# Patient Record
Sex: FEMALE | Race: BLACK OR AFRICAN AMERICAN | NOT HISPANIC OR LATINO | Employment: OTHER | URBAN - METROPOLITAN AREA
[De-identification: names, ages, dates, MRNs, and addresses within clinical notes are randomized per-mention and may not be internally consistent; named-entity substitution may affect disease eponyms.]

---

## 2020-09-03 ENCOUNTER — OFFICE VISIT (OUTPATIENT)
Dept: GASTROENTEROLOGY | Facility: CLINIC | Age: 75
End: 2020-09-03
Payer: MEDICARE

## 2020-09-03 ENCOUNTER — LAB VISIT (OUTPATIENT)
Dept: LAB | Facility: HOSPITAL | Age: 75
End: 2020-09-03
Attending: NURSE PRACTITIONER
Payer: MEDICARE

## 2020-09-03 VITALS
SYSTOLIC BLOOD PRESSURE: 130 MMHG | HEIGHT: 65 IN | HEART RATE: 76 BPM | DIASTOLIC BLOOD PRESSURE: 76 MMHG | WEIGHT: 191.81 LBS | BODY MASS INDEX: 31.96 KG/M2 | OXYGEN SATURATION: 99 %

## 2020-09-03 DIAGNOSIS — K62.5 RECTAL BLEEDING: ICD-10-CM

## 2020-09-03 DIAGNOSIS — K62.5 RECTAL BLEEDING: Primary | ICD-10-CM

## 2020-09-03 DIAGNOSIS — K59.04 CHRONIC IDIOPATHIC CONSTIPATION: ICD-10-CM

## 2020-09-03 LAB
BASOPHILS # BLD AUTO: 0.04 K/UL (ref 0–0.2)
BASOPHILS NFR BLD: 0.5 % (ref 0–1.9)
DIFFERENTIAL METHOD: ABNORMAL
EOSINOPHIL # BLD AUTO: 0.1 K/UL (ref 0–0.5)
EOSINOPHIL NFR BLD: 1.4 % (ref 0–8)
ERYTHROCYTE [DISTWIDTH] IN BLOOD BY AUTOMATED COUNT: 14.6 % (ref 11.5–14.5)
HCT VFR BLD AUTO: 38.1 % (ref 37–48.5)
HGB BLD-MCNC: 11.9 G/DL (ref 12–16)
IMM GRANULOCYTES # BLD AUTO: 0.01 K/UL (ref 0–0.04)
IMM GRANULOCYTES NFR BLD AUTO: 0.1 % (ref 0–0.5)
LYMPHOCYTES # BLD AUTO: 2.3 K/UL (ref 1–4.8)
LYMPHOCYTES NFR BLD: 28.3 % (ref 18–48)
MCH RBC QN AUTO: 27.7 PG (ref 27–31)
MCHC RBC AUTO-ENTMCNC: 31.2 G/DL (ref 32–36)
MCV RBC AUTO: 89 FL (ref 82–98)
MONOCYTES # BLD AUTO: 0.7 K/UL (ref 0.3–1)
MONOCYTES NFR BLD: 8.6 % (ref 4–15)
NEUTROPHILS # BLD AUTO: 5 K/UL (ref 1.8–7.7)
NEUTROPHILS NFR BLD: 61.1 % (ref 38–73)
NRBC BLD-RTO: 0 /100 WBC
PLATELET # BLD AUTO: 271 K/UL (ref 150–350)
PMV BLD AUTO: 11 FL (ref 9.2–12.9)
RBC # BLD AUTO: 4.3 M/UL (ref 4–5.4)
WBC # BLD AUTO: 8.13 K/UL (ref 3.9–12.7)

## 2020-09-03 PROCEDURE — 99999 PR PBB SHADOW E&M-NEW PATIENT-LVL IV: CPT | Mod: PBBFAC,,, | Performed by: NURSE PRACTITIONER

## 2020-09-03 PROCEDURE — 85025 COMPLETE CBC W/AUTO DIFF WBC: CPT

## 2020-09-03 PROCEDURE — 99204 PR OFFICE/OUTPT VISIT, NEW, LEVL IV, 45-59 MIN: ICD-10-PCS | Mod: S$PBB,,, | Performed by: NURSE PRACTITIONER

## 2020-09-03 PROCEDURE — 36415 COLL VENOUS BLD VENIPUNCTURE: CPT

## 2020-09-03 PROCEDURE — 99204 OFFICE O/P NEW MOD 45 MIN: CPT | Mod: S$PBB,,, | Performed by: NURSE PRACTITIONER

## 2020-09-03 PROCEDURE — 99999 PR PBB SHADOW E&M-NEW PATIENT-LVL IV: ICD-10-PCS | Mod: PBBFAC,,, | Performed by: NURSE PRACTITIONER

## 2020-09-03 PROCEDURE — 99204 OFFICE O/P NEW MOD 45 MIN: CPT | Mod: PBBFAC | Performed by: NURSE PRACTITIONER

## 2020-09-03 RX ORDER — CYCLOBENZAPRINE HCL 5 MG
5 TABLET ORAL 3 TIMES DAILY PRN
COMMUNITY

## 2020-09-03 RX ORDER — CARVEDILOL 6.25 MG/1
TABLET ORAL
COMMUNITY
Start: 2020-02-06

## 2020-09-03 RX ORDER — PANTOPRAZOLE SODIUM 40 MG/1
40 TABLET, DELAYED RELEASE ORAL
COMMUNITY
Start: 2020-02-06

## 2020-09-03 RX ORDER — BENZONATATE 100 MG/1
100 CAPSULE ORAL 3 TIMES DAILY PRN
COMMUNITY
Start: 2020-06-24 | End: 2021-06-24

## 2020-09-03 RX ORDER — LOSARTAN POTASSIUM AND HYDROCHLOROTHIAZIDE 25; 100 MG/1; MG/1
1 TABLET ORAL
COMMUNITY
Start: 2020-08-07 | End: 2022-05-11

## 2020-09-03 RX ORDER — ASPIRIN 81 MG/1
81 TABLET ORAL DAILY
COMMUNITY

## 2020-09-03 RX ORDER — SUCRALFATE 1 G/1
1 TABLET ORAL 4 TIMES DAILY
COMMUNITY

## 2020-09-03 RX ORDER — POTASSIUM CHLORIDE 20 MEQ/1
TABLET, EXTENDED RELEASE ORAL
COMMUNITY
Start: 2020-02-06

## 2020-09-03 RX ORDER — CLOPIDOGREL BISULFATE 75 MG/1
75 TABLET ORAL
COMMUNITY
Start: 2020-02-06

## 2020-09-03 RX ORDER — FLUOCINONIDE 0.5 MG/G
CREAM TOPICAL
COMMUNITY
Start: 2020-02-06

## 2020-09-03 RX ORDER — ROSUVASTATIN CALCIUM 20 MG/1
20 TABLET, COATED ORAL
COMMUNITY
Start: 2020-02-06 | End: 2021-08-10

## 2020-09-03 RX ORDER — SODIUM, POTASSIUM,MAG SULFATES 17.5-3.13G
SOLUTION, RECONSTITUTED, ORAL ORAL
Qty: 354 ML | Refills: 0 | Status: SHIPPED | OUTPATIENT
Start: 2020-09-03 | End: 2021-08-10

## 2020-09-03 RX ORDER — GABAPENTIN 300 MG/1
300 CAPSULE ORAL
COMMUNITY
Start: 2020-06-18 | End: 2021-06-28

## 2020-09-03 NOTE — PROGRESS NOTES
Clinic Consult:  [facility] Consultation Note    Reason for Consult:  The primary encounter diagnosis was Rectal bleeding. A diagnosis of Chronic idiopathic constipation was also pertinent to this visit.    PCP: Primary Doctor No   No address on file    HPI:  This is a 75 y.o. female here for evaluation of the above. She has a history of constipation, hemorrhoids, and rectal bleeding. Bleeding occurs intermittently. She is on Linzess 145 mcg three times per week for constipation. She was doing well until this 3 days ago when she had significant rectal bleeding with red blood. It was enough to splash on the toilet seat and walls. This occurred multiple times. Most recently this morning. She denies any symptoms of anemia. Her last colonoscopy was done 2/5/2016. +ve for internal hemorrhoids, diverticulosis, 3 polyps.     Review of Systems   Constitutional: Negative for fever, malaise/fatigue and weight loss.   HENT: Negative for sore throat.    Respiratory: Negative for cough and wheezing.    Cardiovascular: Negative for chest pain and palpitations.   Gastrointestinal: Positive for blood in stool. Negative for abdominal pain, constipation, diarrhea, heartburn, melena, nausea and vomiting.   Genitourinary: Negative for dysuria and frequency.   Musculoskeletal: Negative for back pain, joint pain, myalgias and neck pain.   Skin: Negative for itching and rash.   Neurological: Negative for dizziness, speech change, seizures, loss of consciousness and headaches.   Psychiatric/Behavioral: Negative for depression and substance abuse. The patient is not nervous/anxious.        Medical History:  has a past medical history of ASHD (arteriosclerotic heart disease), Bladder cancer, Chronic back pain, Colon polyp, Diabetes, GERD (gastroesophageal reflux disease), Heart attack, HTN (hypertension), Hyperlipidemia, and Kidney stones.    Surgical History:  has a past surgical history that includes Coronary angioplasty with  "stent; Hysterectomy; lower back surgery ; Rotator cuff repair; TURBT, WITH BLUE LIGHT CYSTOSCOPY AND CYSVIEW; and Wrist surgery.    Family History: family history includes Arthritis in her father; Cancer in her brother; Diabetes in her brother, sister, and son; Hyperlipidemia in her mother and sister; Hypertension in her father and mother; Stroke in her sister..     Social History:  reports that she has never smoked. She does not have any smokeless tobacco history on file. She reports that she does not drink alcohol or use drugs.    Allergies: Reviewed    Home Medications:   Current Outpatient Medications on File Prior to Visit   Medication Sig Dispense Refill    aspirin (ECOTRIN) 81 MG EC tablet Take 81 mg by mouth once daily.      benzonatate (TESSALON) 100 MG capsule Take 100 mg by mouth 3 (three) times daily as needed.      carvediloL (COREG) 6.25 MG tablet Take 1 tablet by mouth 2 (two) times daily with meals      clopidogreL (PLAVIX) 75 mg tablet Take 75 mg by mouth.      cyclobenzaprine (FLEXERIL) 5 MG tablet Take 5 mg by mouth 3 (three) times daily as needed.      fluocinonide 0.05% (LIDEX) 0.05 % cream Apply topically.      gabapentin (NEURONTIN) 300 MG capsule Take 300 mg by mouth.      linaCLOtide (LINZESS) 145 mcg Cap capsule TAKE 1 CAPSULE BY MOUTH ONCE DAILY IN THE MORNING BEFORE BREAKFAST      losartan-hydrochlorothiazide 100-25 mg (HYZAAR) 100-25 mg per tablet Take 1 tablet by mouth.      multivitamin capsule Take 1 capsule by mouth.      pantoprazole (PROTONIX) 40 MG tablet Take 40 mg by mouth.      potassium chloride SA (K-DUR,KLOR-CON) 20 MEQ tablet Take 1 tablet by mouth 2 (two) daily      rosuvastatin (CRESTOR) 20 MG tablet Take 20 mg by mouth.      sucralfate (CARAFATE) 1 gram tablet Take 1 g by mouth 4 (four) times daily.       No current facility-administered medications on file prior to visit.        Physical Exam:  /76   Pulse 76   Ht 5' 5" (1.651 m)   Wt 87 kg (191 " lb 12.8 oz)   SpO2 99%   BMI 31.92 kg/m²   Body mass index is 31.92 kg/m².  Physical Exam   Constitutional: She is oriented to person, place, and time and well-developed, well-nourished, and in no distress. No distress.   HENT:   Head: Normocephalic.   Eyes: Pupils are equal, round, and reactive to light. Conjunctivae are normal.   Cardiovascular: Normal rate, regular rhythm and normal heart sounds.   Pulmonary/Chest: Effort normal and breath sounds normal. No respiratory distress.   Abdominal: Soft. Bowel sounds are normal. She exhibits no distension. There is no abdominal tenderness.   Neurological: She is alert and oriented to person, place, and time. No cranial nerve deficit.   Skin: Skin is warm and dry. No rash noted.   Psychiatric: Mood and affect normal.       Labs: Pertinent labs reviewed.    Assessment:  1. Rectal bleeding    2. Chronic idiopathic constipation      Bleeding likely diverticular bleed.     Recommendations:   - needs colonoscopy for further evaluation  - CBC today.     Rectal bleeding  -     CBC auto differential; Future; Expected date: 09/03/2020  -     Case request GI: COLONOSCOPY  -     SUPREP BOWEL PREP KIT 17.5-3.13-1.6 gram SolR; Use as directed  Dispense: 354 mL; Refill: 0  -     COVID-19 Routine Screening; Future; Expected date: 09/03/2020    Chronic idiopathic constipation      Follow up to be determined after results/ procedure(s).    Thank you so much for allowing me to participate in the care of Porsha Adams  DAVE Alicia

## 2020-09-17 ENCOUNTER — TELEPHONE (OUTPATIENT)
Dept: ENDOSCOPY | Facility: HOSPITAL | Age: 75
End: 2020-09-17

## 2020-12-01 ENCOUNTER — TELEPHONE (OUTPATIENT)
Dept: UROLOGY | Facility: CLINIC | Age: 75
End: 2020-12-01

## 2020-12-01 ENCOUNTER — OFFICE VISIT (OUTPATIENT)
Dept: UROLOGY | Facility: CLINIC | Age: 75
End: 2020-12-01
Payer: MEDICARE

## 2020-12-01 VITALS
RESPIRATION RATE: 18 BRPM | SYSTOLIC BLOOD PRESSURE: 160 MMHG | HEIGHT: 65 IN | WEIGHT: 198.88 LBS | BODY MASS INDEX: 33.13 KG/M2 | HEART RATE: 80 BPM | DIASTOLIC BLOOD PRESSURE: 84 MMHG

## 2020-12-01 DIAGNOSIS — C67.9 MALIGNANT NEOPLASM OF URINARY BLADDER, UNSPECIFIED SITE: ICD-10-CM

## 2020-12-01 DIAGNOSIS — R10.9 FLANK PAIN: Primary | ICD-10-CM

## 2020-12-01 DIAGNOSIS — N20.0 KIDNEY STONES: ICD-10-CM

## 2020-12-01 PROBLEM — I10 HYPERTENSION: Status: ACTIVE | Noted: 2017-09-28

## 2020-12-01 PROBLEM — E87.6 HYPOKALEMIA: Status: ACTIVE | Noted: 2020-02-11

## 2020-12-01 PROBLEM — E11.69 HYPERLIPIDEMIA ASSOCIATED WITH TYPE 2 DIABETES MELLITUS: Status: ACTIVE | Noted: 2017-09-28

## 2020-12-01 PROBLEM — I82.503: Status: ACTIVE | Noted: 2020-08-28

## 2020-12-01 PROBLEM — I25.2 HISTORY OF MI (MYOCARDIAL INFARCTION): Status: ACTIVE | Noted: 2020-02-11

## 2020-12-01 PROBLEM — Z86.010 HISTORY OF COLONIC POLYPS: Status: ACTIVE | Noted: 2019-09-19

## 2020-12-01 PROBLEM — E78.5 HYPERLIPIDEMIA ASSOCIATED WITH TYPE 2 DIABETES MELLITUS: Status: ACTIVE | Noted: 2017-09-28

## 2020-12-01 PROBLEM — G89.29 CHRONIC BACK PAIN: Status: ACTIVE | Noted: 2020-12-01

## 2020-12-01 PROBLEM — E11.65 TYPE 2 DIABETES MELLITUS WITH HYPERGLYCEMIA, WITHOUT LONG-TERM CURRENT USE OF INSULIN: Status: ACTIVE | Noted: 2020-12-01

## 2020-12-01 PROBLEM — K62.5 RECTAL BLEEDING: Status: ACTIVE | Noted: 2020-09-04

## 2020-12-01 PROBLEM — I83.813 VARICOSE VEINS OF BOTH LOWER EXTREMITIES WITH PAIN: Status: ACTIVE | Noted: 2020-08-28

## 2020-12-01 PROBLEM — Z86.0100 HISTORY OF COLONIC POLYPS: Status: ACTIVE | Noted: 2019-09-19

## 2020-12-01 PROBLEM — K21.9 GASTROESOPHAGEAL REFLUX DISEASE WITHOUT ESOPHAGITIS: Status: ACTIVE | Noted: 2020-12-01

## 2020-12-01 PROBLEM — E66.01 MORBID OBESITY DUE TO EXCESS CALORIES: Status: ACTIVE | Noted: 2017-09-28

## 2020-12-01 PROBLEM — M54.9 CHRONIC BACK PAIN: Status: ACTIVE | Noted: 2020-12-01

## 2020-12-01 LAB
BILIRUB SERPL-MCNC: NORMAL MG/DL
BLOOD URINE, POC: NORMAL
CLARITY, POC UA: NORMAL
COLOR, POC UA: YELLOW
GLUCOSE UR QL STRIP: NORMAL
KETONES UR QL STRIP: NORMAL
LEUKOCYTE ESTERASE URINE, POC: NORMAL
NITRITE, POC UA: NORMAL
PH, POC UA: 8
PROTEIN, POC: NORMAL
SPECIFIC GRAVITY, POC UA: 1
UROBILINOGEN, POC UA: NORMAL

## 2020-12-01 PROCEDURE — 99214 PR OFFICE/OUTPT VISIT, EST, LEVL IV, 30-39 MIN: ICD-10-PCS | Mod: S$PBB,,, | Performed by: UROLOGY

## 2020-12-01 PROCEDURE — 99999 PR PBB SHADOW E&M-EST. PATIENT-LVL IV: CPT | Mod: PBBFAC,,, | Performed by: UROLOGY

## 2020-12-01 PROCEDURE — 81002 URINALYSIS NONAUTO W/O SCOPE: CPT | Mod: PBBFAC,PO | Performed by: UROLOGY

## 2020-12-01 PROCEDURE — 87086 URINE CULTURE/COLONY COUNT: CPT

## 2020-12-01 PROCEDURE — 99214 OFFICE O/P EST MOD 30 MIN: CPT | Mod: PBBFAC,PO | Performed by: UROLOGY

## 2020-12-01 PROCEDURE — 99999 PR PBB SHADOW E&M-EST. PATIENT-LVL IV: ICD-10-PCS | Mod: PBBFAC,,, | Performed by: UROLOGY

## 2020-12-01 PROCEDURE — 99214 OFFICE O/P EST MOD 30 MIN: CPT | Mod: S$PBB,,, | Performed by: UROLOGY

## 2020-12-01 RX ORDER — LEVOCETIRIZINE DIHYDROCHLORIDE 5 MG/1
TABLET, FILM COATED ORAL
COMMUNITY
Start: 2020-09-09 | End: 2021-08-10 | Stop reason: SDUPTHER

## 2020-12-01 RX ORDER — ACETAMINOPHEN AND CODEINE PHOSPHATE 300; 30 MG/1; MG/1
TABLET ORAL
COMMUNITY
Start: 2020-10-22

## 2020-12-01 NOTE — TELEPHONE ENCOUNTER
----- Message from Rachel Coreas sent at 12/1/2020  1:04 PM CST -----  Contact: SELF/[phone]  Type:  Patient Returning Call    Who Called:Porsha Adams  Who Left Message for Patient:nurse  Does the patient know what this is regarding?:no  Would the patient rather a call back or a response via [email or web address]? Call back   Best Call Back Number:[phone]  Additional Information:

## 2020-12-01 NOTE — PROGRESS NOTES
Chief Complaint   Patient presents with    [facility]    Bladder Cancer     past medical hx          History of Present Illness:   Porsha Adams is a 75 y.o. female here for follow up. She has a h/o urolithiasis and bladder cancer, with last recurrence in 7/2012. She had a bladder biopsy by me last year, which was negative for malignancy.    Within the last 2-3 weeks, she has had bilateral flank pain. She is also having urinary frequency. She also had an episode of urge incontinence during the nighttime. +urgency. Lasted for about 3 days. Also had some dysuria at that time. No gross hematuria.   She also felt like she was having abdominal swelling. She is also due for her yearly bladder cancer surveillance cysto.       Review of Systems:   Constitutional: Pt denies fever, chills, change in appetite or unintentional weight loss  HENT: No drooling, loss of hearing, mouth sores, facial swelling  Eyes: No photophobia, visual disturbance or eye pain  Respiratory: No cough, dyspnea, wheezing  Cardiovascular: No chest pain, palpitations or leg swelling  GI: No constipation, diarrhea, nausea, vomiting, melena or hematochezia  : No genital lesions, discharge, nocturnal enuresis  Endocrine: No polydipsia, polyphagia, heat or cold intolerance  Musculoskeletal: +lower back pain, shoulder and knee pain  Integument: No color change, rash or wounds  Neurological: No seizures, speech difficulty or tremors  Psychiatric: No confusion, self-harm or Hallucinations    Current Outpatient Medications   Medication Sig Dispense Refill    aspirin (ECOTRIN) 81 MG EC tablet Take 81 mg by mouth once daily.      benzonatate (TESSALON) 100 MG capsule Take 100 mg by mouth 3 (three) times daily as needed.      carvediloL (COREG) 6.25 MG tablet Take 1 tablet by mouth 2 (two) times daily with meals      clopidogreL (PLAVIX) 75 mg tablet Take 75 mg by mouth.      cyclobenzaprine (FLEXERIL) 5 MG tablet Take 5 mg by mouth 3 (three)  times daily as needed.      fluocinonide 0.05% (LIDEX) 0.05 % cream Apply topically.      gabapentin (NEURONTIN) 300 MG capsule Take 300 mg by mouth.      linaCLOtide (LINZESS) 145 mcg Cap capsule TAKE 1 CAPSULE BY MOUTH ONCE DAILY IN THE MORNING BEFORE BREAKFAST      losartan-hydrochlorothiazide 100-25 mg (HYZAAR) 100-25 mg per tablet Take 1 tablet by mouth.      multivitamin capsule Take 1 capsule by mouth.      pantoprazole (PROTONIX) 40 MG tablet Take 40 mg by mouth.      potassium chloride SA (K-DUR,KLOR-CON) 20 MEQ tablet Take 1 tablet by mouth 2 (two) daily      rosuvastatin (CRESTOR) 20 MG tablet Take 20 mg by mouth.      sucralfate (CARAFATE) 1 gram tablet Take 1 g by mouth 4 (four) times daily.      SUPREP BOWEL PREP KIT 17.5-3.13-1.6 gram SolR Use as directed 354 mL 0    acetaminophen-codeine 300-30mg (TYLENOL #3) 300-30 mg Tab TAKE 1 TABLET BY MOUTH EVERY 4 HOURS AS NEEDED FOR PAIN FOR UP TO 10 DAYS      levocetirizine (XYZAL) 5 MG tablet TAKE 1 TABLET BY MOUTH IN THE MORNING AS NEEDED FOR ITCHING       No current facility-administered medications for this visit.        Review of patient's allergies indicates:   Allergen Reactions    Hydrocodone     Prednisone Other (See Comments)     Causes anxiety    Atorvastatin      Muscle spasm       Past medical, family, and social history reviewed as documented in chart with pertinent positive medical, family, and social history detailed in HPI.    Physical Exam  Vitals:    12/01/20 0826   BP: (!) 160/84   Pulse: 80   Resp: 18       General: Well-developed, Well Nourished in No acute distress  HEENT: Normocephalic, Atraumatic, Extraocular Movements intact  Neck: Supple, trachea midline, no cervical or supraclavicular lymphadenopathy  Respirations: Even and unlabored  Back: Midline spine without tenderness, + bilateral CVA tenderness  Abdomen: Soft, tender in both right and left upper quadrants and LLQ, non-distended, no hepatosplenomegaly, no  rebound or guarding, no palpable masses  Extremities: Moves all equally, atraumatic, no clubbing, cyanosis or edema  Skin: warm and dry  Psych: normal affect  Neuro: Alert and oriented x 3, Cranial nerves II-XII grossly intact      Urinalysis  Color, UA   Date Value Ref Range Status   12/01/2020 Yellow  Final     pH, UA   Date Value Ref Range Status   12/01/2020 8  Final     Spec Grav UA   Date Value Ref Range Status   12/01/2020 1.005  Final     Nitrite, UA   Date Value Ref Range Status   12/01/2020 neg  Final       Assesment:   1. Flank pain  POCT URINE DIPSTICK WITHOUT MICROSCOPE    Urine culture    CT Urogram Abd Pelvis W WO   2. Malignant neoplasm of urinary bladder, unspecified site  BUN    CT Urogram Abd Pelvis W WO    Creatinine, Serum   3. Kidney stones           Plan:  Flank pain  -     POCT URINE DIPSTICK WITHOUT MICROSCOPE  -     Urine culture  -     CT Urogram Abd Pelvis W WO; Future; Expected date: 12/01/2020    Malignant neoplasm of urinary bladder, unspecified site  -     BUN; Future; Expected date: 12/01/2020  -     CT Urogram Abd Pelvis W WO; Future; Expected date: 12/01/2020  -     Creatinine, Serum; Future; Expected date: 12/01/2020    Kidney stones        Follow up for Cystoscopy.

## 2020-12-01 NOTE — TELEPHONE ENCOUNTER
Spoke to Ms. Adams and explained that we did not try calling her, but another department with in [facility] may have. She verbally understood.

## 2020-12-03 LAB — BACTERIA UR CULT: NO GROWTH

## 2020-12-04 ENCOUNTER — TELEPHONE (OUTPATIENT)
Dept: RADIOLOGY | Facility: HOSPITAL | Age: 75
End: 2020-12-04

## 2020-12-07 ENCOUNTER — HOSPITAL ENCOUNTER (OUTPATIENT)
Dept: RADIOLOGY | Facility: HOSPITAL | Age: 75
Discharge: HOME | End: 2020-12-07
Attending: UROLOGY
Payer: MEDICARE

## 2020-12-07 DIAGNOSIS — C67.9 MALIGNANT NEOPLASM OF URINARY BLADDER, UNSPECIFIED SITE: ICD-10-CM

## 2020-12-07 DIAGNOSIS — R10.9 FLANK PAIN: ICD-10-CM

## 2020-12-07 PROCEDURE — 74178 CT ABD&PLV WO CNTR FLWD CNTR: CPT | Mod: TC

## 2020-12-07 PROCEDURE — 25500020 PHARM REV CODE 255: Performed by: UROLOGY

## 2020-12-07 PROCEDURE — 74178 CT ABD&PLV WO CNTR FLWD CNTR: CPT | Mod: 26,,, | Performed by: RADIOLOGY

## 2020-12-07 PROCEDURE — 74178 CT UROGRAM ABD PELVIS W WO: ICD-10-PCS | Mod: 26,,, | Performed by: RADIOLOGY

## 2020-12-07 RX ADMIN — IOHEXOL 125 ML: 350 INJECTION, SOLUTION INTRAVENOUS at 01:12

## 2020-12-11 ENCOUNTER — TELEPHONE (OUTPATIENT)
Dept: UROLOGY | Facility: CLINIC | Age: 75
End: 2020-12-11

## 2020-12-11 NOTE — TELEPHONE ENCOUNTER
----- Message from Claudine Asencio sent at 12/11/2020 12:54 PM CST -----  Regarding: returning a call  Contact: pt  Type:  Patient Returning Call    Who Called:pt  Who Left Message for Patient:nurse  Does the patient know what this is regarding?:maybe results  Would the patient rather a call back or a response via [facility]? Call back  Best Call Back Number:[phone]  or [phone]  Additional Information: none

## 2020-12-11 NOTE — TELEPHONE ENCOUNTER
Spoke to Mrs. Adams and explained that we did not call. She verbally understood and then asked if the results of her testing came back. I explained that the labs were normal, urine culture was good, but Dr. Terrazas will have to review the CT and we will call her back with the results. She verbally understood.

## 2020-12-15 ENCOUNTER — TELEPHONE (OUTPATIENT)
Dept: UROLOGY | Facility: CLINIC | Age: 75
End: 2020-12-15

## 2020-12-15 DIAGNOSIS — N13.30 HYDRONEPHROSIS, UNSPECIFIED HYDRONEPHROSIS TYPE: Primary | ICD-10-CM

## 2020-12-16 ENCOUNTER — OFFICE VISIT (OUTPATIENT)
Dept: UROLOGY | Facility: CLINIC | Age: 75
End: 2020-12-16
Payer: MEDICARE

## 2020-12-16 VITALS
TEMPERATURE: 98 F | BODY MASS INDEX: 32.95 KG/M2 | WEIGHT: 197.75 LBS | DIASTOLIC BLOOD PRESSURE: 60 MMHG | HEIGHT: 65 IN | SYSTOLIC BLOOD PRESSURE: 110 MMHG

## 2020-12-16 DIAGNOSIS — N13.30 HYDRONEPHROSIS, UNSPECIFIED HYDRONEPHROSIS TYPE: ICD-10-CM

## 2020-12-16 DIAGNOSIS — C67.9 MALIGNANT NEOPLASM OF URINARY BLADDER, UNSPECIFIED SITE: Primary | ICD-10-CM

## 2020-12-16 LAB
BILIRUB SERPL-MCNC: NORMAL MG/DL
BLOOD URINE, POC: NORMAL
CLARITY, POC UA: NORMAL
COLOR, POC UA: YELLOW
GLUCOSE UR QL STRIP: NORMAL
KETONES UR QL STRIP: NORMAL
LEUKOCYTE ESTERASE URINE, POC: NORMAL
NITRITE, POC UA: NORMAL
PH, POC UA: 6
PROTEIN, POC: NORMAL
SPECIFIC GRAVITY, POC UA: 1.02
UROBILINOGEN, POC UA: NORMAL

## 2020-12-16 PROCEDURE — 52000 PR CYSTOURETHROSCOPY: ICD-10-PCS | Mod: S$PBB,,, | Performed by: UROLOGY

## 2020-12-16 PROCEDURE — 81002 URINALYSIS NONAUTO W/O SCOPE: CPT | Mod: PBBFAC | Performed by: UROLOGY

## 2020-12-16 PROCEDURE — 88112 CYTOPATH CELL ENHANCE TECH: CPT | Mod: 26,,, | Performed by: PATHOLOGY

## 2020-12-16 PROCEDURE — 99999 PR PBB SHADOW E&M-EST. PATIENT-LVL III: ICD-10-PCS | Mod: PBBFAC,,, | Performed by: UROLOGY

## 2020-12-16 PROCEDURE — 52000 CYSTOURETHROSCOPY: CPT | Mod: S$PBB,,, | Performed by: UROLOGY

## 2020-12-16 PROCEDURE — 99999 PR PBB SHADOW E&M-EST. PATIENT-LVL III: CPT | Mod: PBBFAC,,, | Performed by: UROLOGY

## 2020-12-16 PROCEDURE — 99499 UNLISTED E&M SERVICE: CPT | Mod: S$PBB,,, | Performed by: UROLOGY

## 2020-12-16 PROCEDURE — 99499 NO LOS: ICD-10-PCS | Mod: S$PBB,,, | Performed by: UROLOGY

## 2020-12-16 PROCEDURE — 88112 CYTOPATH CELL ENHANCE TECH: CPT | Performed by: PATHOLOGY

## 2020-12-16 PROCEDURE — 88112 PR  CYTOPATH, CELL ENHANCE TECH: ICD-10-PCS | Mod: 26,,, | Performed by: PATHOLOGY

## 2020-12-16 PROCEDURE — 52000 CYSTOURETHROSCOPY: CPT | Mod: PBBFAC | Performed by: UROLOGY

## 2020-12-16 PROCEDURE — 99213 OFFICE O/P EST LOW 20 MIN: CPT | Mod: PBBFAC,25 | Performed by: UROLOGY

## 2020-12-16 RX ORDER — LIDOCAINE HYDROCHLORIDE 20 MG/ML
JELLY TOPICAL
Status: COMPLETED | OUTPATIENT
Start: 2020-12-16 | End: 2020-12-16

## 2020-12-16 RX ADMIN — LIDOCAINE HYDROCHLORIDE: 20 JELLY TOPICAL at 10:12

## 2020-12-16 NOTE — PROGRESS NOTES
.Pt came in for cystoscope. Janet area cleaned with betadine swabs. Lidocaine 2% urojet jelly inserted in urethra. Cystoscope performed per MD. Follow up given. Pt left ambulatory.

## 2020-12-16 NOTE — PROGRESS NOTES
Procedure: Cystoscopy    Reason for procedure: follow up bladder cancer, last recurrence 7/2012.     Workup has included: CT urogram, personally reviewed, showing a left parapelvic renal cyst with some prominence of the left renal collecting system. No solid renal mass or filling defects. No stones.     Procedure in detail:   Informed consent was obtained. The patient's genitalia was prepped and draped in the usual sterile fashion. Lidocaine jelly was administered per urethra. I utilized the flexible cystoscope and advanced it into the urethral meatus. Bladder access was obtained. Systematic review of the bladder was performed, noting no stones, foreign bodies or masses. There is a small diverticulum at the lower posterior bladder wall. Bilateral ureteral orifices were visualized and noted to be effluxing clear urine. Retroflexion was utilized to visualize the bladder neck, noting no lesions. The scope was slowly backed out of the urethra, noting no urethral lesions. The patient tolerated the procedure well. Estimated blood loss was 0cc.     Porsha was seen today for cysto.    Diagnoses and all orders for this visit:    Malignant neoplasm of urinary bladder, unspecified site  -     lidocaine HCl 2% urojet    Hydronephrosis, unspecified hydronephrosis type    Mag-3 renal scan pending    Follow up in about 1 year (around 12/16/2021) for Cystoscopy.

## 2020-12-17 ENCOUNTER — TELEPHONE (OUTPATIENT)
Dept: UROLOGY | Facility: CLINIC | Age: 75
End: 2020-12-17

## 2020-12-17 NOTE — TELEPHONE ENCOUNTER
----- Message from Jeancarlos Mitchell sent at 12/17/2020 12:03 PM CST -----  Contact: Pt  Pt is calling rg the CT scan that was done on 12/07 and states that she wants to know if it was with and without dye and also was it of the kidney and bladder and can be reached at [phone]//thanks/dbw   Pt req a call back asap

## 2020-12-17 NOTE — TELEPHONE ENCOUNTER
Spoke to Mrs. Adams, she asked if the CT that was done on 12/7/20 looked at the kidneys, I said yes. She asked about procedure scheduled for 12/22/20, NM Kidney Flow. She wanted to know if that is the same type of test. I explained no, that one will look at the blood flow to the kidneys. She verbally understood.

## 2020-12-18 LAB — FINAL PATHOLOGIC DIAGNOSIS: NORMAL

## 2020-12-22 ENCOUNTER — TELEPHONE (OUTPATIENT)
Dept: UROLOGY | Facility: CLINIC | Age: 75
End: 2020-12-22

## 2020-12-22 ENCOUNTER — HOSPITAL ENCOUNTER (OUTPATIENT)
Dept: RADIOLOGY | Facility: HOSPITAL | Age: 75
Discharge: HOME | End: 2020-12-22
Attending: UROLOGY
Payer: MEDICARE

## 2020-12-22 DIAGNOSIS — N13.30 HYDRONEPHROSIS, UNSPECIFIED HYDRONEPHROSIS TYPE: ICD-10-CM

## 2020-12-22 PROCEDURE — A9562 TC99M MERTIATIDE: HCPCS

## 2020-12-22 PROCEDURE — 78708 NM KIDNEY W FLOW AND FUNCTION W PHARMACOLOGICAL: ICD-10-PCS | Mod: 26,,, | Performed by: RADIOLOGY

## 2020-12-22 PROCEDURE — 63600175 PHARM REV CODE 636 W HCPCS: Performed by: UROLOGY

## 2020-12-22 PROCEDURE — 78708 K FLOW/FUNCT IMAGE W/DRUG: CPT | Mod: 26,,, | Performed by: RADIOLOGY

## 2020-12-22 RX ORDER — FUROSEMIDE 10 MG/ML
20 INJECTION INTRAMUSCULAR; INTRAVENOUS ONCE
Status: COMPLETED | OUTPATIENT
Start: 2020-12-22 | End: 2020-12-22

## 2020-12-22 RX ADMIN — FUROSEMIDE 27 MG: 10 INJECTION, SOLUTION INTRAMUSCULAR; INTRAVENOUS at 03:12

## 2020-12-22 NOTE — TELEPHONE ENCOUNTER
----- Message from Suma Terrazas MD sent at 12/22/2020  2:57 PM CST -----  Notify patient her kidney does not appear to be obstructed. We will continue to monitor the cyst.

## 2021-01-26 ENCOUNTER — TELEPHONE (OUTPATIENT)
Dept: UROLOGY | Facility: CLINIC | Age: 76
End: 2021-01-26

## 2021-05-13 ENCOUNTER — OFFICE VISIT (OUTPATIENT)
Dept: DERMATOLOGY | Facility: CLINIC | Age: 76
End: 2021-05-13
Payer: MEDICARE

## 2021-05-13 DIAGNOSIS — L30.9 DERMATITIS: Primary | ICD-10-CM

## 2021-05-13 PROCEDURE — 99202 OFFICE O/P NEW SF 15 MIN: CPT | Mod: S$PBB,,, | Performed by: STUDENT IN AN ORGANIZED HEALTH CARE EDUCATION/TRAINING PROGRAM

## 2021-05-13 PROCEDURE — 99999 PR PBB SHADOW E&M-EST. PATIENT-LVL III: ICD-10-PCS | Mod: PBBFAC,,, | Performed by: STUDENT IN AN ORGANIZED HEALTH CARE EDUCATION/TRAINING PROGRAM

## 2021-05-13 PROCEDURE — 99213 OFFICE O/P EST LOW 20 MIN: CPT | Mod: PBBFAC | Performed by: STUDENT IN AN ORGANIZED HEALTH CARE EDUCATION/TRAINING PROGRAM

## 2021-05-13 PROCEDURE — 99202 PR OFFICE/OUTPT VISIT, NEW, LEVL II, 15-29 MIN: ICD-10-PCS | Mod: S$PBB,,, | Performed by: STUDENT IN AN ORGANIZED HEALTH CARE EDUCATION/TRAINING PROGRAM

## 2021-05-13 PROCEDURE — 99999 PR PBB SHADOW E&M-EST. PATIENT-LVL III: CPT | Mod: PBBFAC,,, | Performed by: STUDENT IN AN ORGANIZED HEALTH CARE EDUCATION/TRAINING PROGRAM

## 2021-05-13 RX ORDER — PIMECROLIMUS 10 MG/G
CREAM TOPICAL 2 TIMES DAILY
Qty: 100 G | Refills: 1 | Status: SHIPPED | OUTPATIENT
Start: 2021-05-13

## 2021-05-13 RX ORDER — PREDNISONE 10 MG/1
TABLET ORAL
Qty: 15 TABLET | Refills: 0 | Status: SHIPPED | OUTPATIENT
Start: 2021-05-13 | End: 2021-06-28

## 2021-05-19 ENCOUNTER — TELEPHONE (OUTPATIENT)
Dept: DERMATOLOGY | Facility: CLINIC | Age: 76
End: 2021-05-19

## 2021-05-26 ENCOUNTER — TELEPHONE (OUTPATIENT)
Dept: DERMATOLOGY | Facility: CLINIC | Age: 76
End: 2021-05-26

## 2021-06-15 ENCOUNTER — OFFICE VISIT (OUTPATIENT)
Dept: DERMATOLOGY | Facility: CLINIC | Age: 76
End: 2021-06-15
Payer: MEDICARE

## 2021-06-15 DIAGNOSIS — L30.9 DERMATITIS: Primary | ICD-10-CM

## 2021-06-15 PROCEDURE — 99214 OFFICE O/P EST MOD 30 MIN: CPT | Mod: PBBFAC | Performed by: STUDENT IN AN ORGANIZED HEALTH CARE EDUCATION/TRAINING PROGRAM

## 2021-06-15 PROCEDURE — 99214 OFFICE O/P EST MOD 30 MIN: CPT | Mod: S$PBB,,, | Performed by: STUDENT IN AN ORGANIZED HEALTH CARE EDUCATION/TRAINING PROGRAM

## 2021-06-15 PROCEDURE — 99214 PR OFFICE/OUTPT VISIT, EST, LEVL IV, 30-39 MIN: ICD-10-PCS | Mod: S$PBB,,, | Performed by: STUDENT IN AN ORGANIZED HEALTH CARE EDUCATION/TRAINING PROGRAM

## 2021-06-15 PROCEDURE — 99999 PR PBB SHADOW E&M-EST. PATIENT-LVL IV: ICD-10-PCS | Mod: PBBFAC,,, | Performed by: STUDENT IN AN ORGANIZED HEALTH CARE EDUCATION/TRAINING PROGRAM

## 2021-06-15 PROCEDURE — 96372 THER/PROPH/DIAG INJ SC/IM: CPT | Mod: PBBFAC

## 2021-06-15 PROCEDURE — 99999 PR PBB SHADOW E&M-EST. PATIENT-LVL IV: CPT | Mod: PBBFAC,,, | Performed by: STUDENT IN AN ORGANIZED HEALTH CARE EDUCATION/TRAINING PROGRAM

## 2021-06-15 RX ORDER — TRIAMCINOLONE ACETONIDE 40 MG/ML
40 INJECTION, SUSPENSION INTRA-ARTICULAR; INTRAMUSCULAR
Status: COMPLETED | OUTPATIENT
Start: 2021-06-15 | End: 2021-06-15

## 2021-06-15 RX ADMIN — TRIAMCINOLONE ACETONIDE 40 MG: 400 INJECTION, SUSPENSION INTRA-ARTICULAR; INTRAMUSCULAR at 09:06

## 2021-06-24 ENCOUNTER — TELEPHONE (OUTPATIENT)
Dept: GASTROENTEROLOGY | Facility: CLINIC | Age: 76
End: 2021-06-24

## 2021-06-24 ENCOUNTER — TELEPHONE (OUTPATIENT)
Dept: ALLERGY | Facility: CLINIC | Age: 76
End: 2021-06-24

## 2021-06-25 ENCOUNTER — TELEPHONE (OUTPATIENT)
Dept: ALLERGY | Facility: CLINIC | Age: 76
End: 2021-06-25

## 2021-06-28 ENCOUNTER — LAB VISIT (OUTPATIENT)
Dept: LAB | Facility: HOSPITAL | Age: 76
End: 2021-06-28
Attending: ALLERGY & IMMUNOLOGY
Payer: MEDICARE

## 2021-06-28 ENCOUNTER — OFFICE VISIT (OUTPATIENT)
Dept: ALLERGY | Facility: CLINIC | Age: 76
End: 2021-06-28
Payer: MEDICARE

## 2021-06-28 VITALS
WEIGHT: 178.13 LBS | SYSTOLIC BLOOD PRESSURE: 113 MMHG | DIASTOLIC BLOOD PRESSURE: 66 MMHG | TEMPERATURE: 98 F | BODY MASS INDEX: 28.63 KG/M2 | HEART RATE: 63 BPM | HEIGHT: 66 IN

## 2021-06-28 DIAGNOSIS — L30.9 DERMATITIS: ICD-10-CM

## 2021-06-28 PROCEDURE — 86038 ANTINUCLEAR ANTIBODIES: CPT | Performed by: ALLERGY & IMMUNOLOGY

## 2021-06-28 PROCEDURE — 86803 HEPATITIS C AB TEST: CPT | Performed by: ALLERGY & IMMUNOLOGY

## 2021-06-28 PROCEDURE — 99215 OFFICE O/P EST HI 40 MIN: CPT | Mod: PBBFAC | Performed by: ALLERGY & IMMUNOLOGY

## 2021-06-28 PROCEDURE — 86682 HELMINTH ANTIBODY: CPT | Performed by: ALLERGY & IMMUNOLOGY

## 2021-06-28 PROCEDURE — 84165 PROTEIN E-PHORESIS SERUM: CPT | Performed by: ALLERGY & IMMUNOLOGY

## 2021-06-28 PROCEDURE — 86592 SYPHILIS TEST NON-TREP QUAL: CPT | Performed by: ALLERGY & IMMUNOLOGY

## 2021-06-28 PROCEDURE — 86705 HEP B CORE ANTIBODY IGM: CPT | Performed by: ALLERGY & IMMUNOLOGY

## 2021-06-28 PROCEDURE — 84165 PROTEIN E-PHORESIS SERUM: CPT | Mod: 26,,, | Performed by: PATHOLOGY

## 2021-06-28 PROCEDURE — 84165 PATHOLOGIST INTERPRETATION SPE: ICD-10-PCS | Mod: 26,,, | Performed by: PATHOLOGY

## 2021-06-28 PROCEDURE — 99204 OFFICE O/P NEW MOD 45 MIN: CPT | Mod: S$PBB,,, | Performed by: ALLERGY & IMMUNOLOGY

## 2021-06-28 PROCEDURE — 99999 PR PBB SHADOW E&M-EST. PATIENT-LVL V: CPT | Mod: PBBFAC,,, | Performed by: ALLERGY & IMMUNOLOGY

## 2021-06-28 PROCEDURE — 99204 PR OFFICE/OUTPT VISIT, NEW, LEVL IV, 45-59 MIN: ICD-10-PCS | Mod: S$PBB,,, | Performed by: ALLERGY & IMMUNOLOGY

## 2021-06-28 PROCEDURE — 86039 ANTINUCLEAR ANTIBODIES (ANA): CPT | Performed by: ALLERGY & IMMUNOLOGY

## 2021-06-28 PROCEDURE — 86703 HIV-1/HIV-2 1 RESULT ANTBDY: CPT | Performed by: ALLERGY & IMMUNOLOGY

## 2021-06-28 PROCEDURE — 36415 COLL VENOUS BLD VENIPUNCTURE: CPT | Performed by: ALLERGY & IMMUNOLOGY

## 2021-06-28 PROCEDURE — 80053 COMPREHEN METABOLIC PANEL: CPT | Performed by: ALLERGY & IMMUNOLOGY

## 2021-06-28 PROCEDURE — 99999 PR PBB SHADOW E&M-EST. PATIENT-LVL V: ICD-10-PCS | Mod: PBBFAC,,, | Performed by: ALLERGY & IMMUNOLOGY

## 2021-06-28 PROCEDURE — 86235 NUCLEAR ANTIGEN ANTIBODY: CPT | Mod: 59 | Performed by: ALLERGY & IMMUNOLOGY

## 2021-06-28 PROCEDURE — 86352 CELL FUNCTION ASSAY W/STIM: CPT | Performed by: ALLERGY & IMMUNOLOGY

## 2021-06-28 RX ORDER — CETIRIZINE HYDROCHLORIDE 1 MG/ML
10 SOLUTION ORAL DAILY
Qty: 300 ML | Refills: 11 | Status: SHIPPED | OUTPATIENT
Start: 2021-06-28 | End: 2022-08-01

## 2021-06-28 RX ORDER — OXYCODONE AND ACETAMINOPHEN 10; 325 MG/1; MG/1
1 TABLET ORAL EVERY 4 HOURS PRN
COMMUNITY
Start: 2021-03-18

## 2021-06-28 RX ORDER — GABAPENTIN 100 MG/1
CAPSULE ORAL
COMMUNITY
Start: 2021-06-24

## 2021-06-28 RX ORDER — DULOXETIN HYDROCHLORIDE 60 MG/1
60 CAPSULE, DELAYED RELEASE ORAL DAILY
COMMUNITY
Start: 2021-04-19

## 2021-06-28 RX ORDER — AMLODIPINE BESYLATE 10 MG/1
10 TABLET ORAL DAILY
COMMUNITY
Start: 2021-06-02

## 2021-06-28 RX ORDER — ONDANSETRON 4 MG/1
4 TABLET, ORALLY DISINTEGRATING ORAL EVERY 8 HOURS PRN
COMMUNITY
Start: 2021-04-28

## 2021-06-28 RX ORDER — MONTELUKAST SODIUM 10 MG/1
10 TABLET ORAL NIGHTLY
Qty: 30 TABLET | Refills: 5 | Status: SHIPPED | OUTPATIENT
Start: 2021-06-28 | End: 2021-07-28

## 2021-06-28 RX ORDER — HYDROCODONE BITARTRATE AND ACETAMINOPHEN 7.5; 325 MG/1; MG/1
1 TABLET ORAL EVERY 6 HOURS PRN
COMMUNITY
Start: 2021-04-26

## 2021-06-29 LAB
ALBUMIN SERPL BCP-MCNC: 3.8 G/DL (ref 3.5–5.2)
ALBUMIN SERPL ELPH-MCNC: 3.91 G/DL (ref 3.35–5.55)
ALP SERPL-CCNC: 61 U/L (ref 55–135)
ALPHA1 GLOB SERPL ELPH-MCNC: 0.3 G/DL (ref 0.17–0.41)
ALPHA2 GLOB SERPL ELPH-MCNC: 0.97 G/DL (ref 0.43–0.99)
ALT SERPL W/O P-5'-P-CCNC: 11 U/L (ref 10–44)
ANA PATTERN 1: NORMAL
ANA SER QL IF: POSITIVE
ANA TITR SER IF: NORMAL {TITER}
ANION GAP SERPL CALC-SCNC: 12 MMOL/L (ref 8–16)
AST SERPL-CCNC: 16 U/L (ref 10–40)
B-GLOBULIN SERPL ELPH-MCNC: 1.07 G/DL (ref 0.5–1.1)
BILIRUB SERPL-MCNC: 0.3 MG/DL (ref 0.1–1)
BUN SERPL-MCNC: 18 MG/DL (ref 8–23)
CALCIUM SERPL-MCNC: 10.2 MG/DL (ref 8.7–10.5)
CHLORIDE SERPL-SCNC: 102 MMOL/L (ref 95–110)
CO2 SERPL-SCNC: 26 MMOL/L (ref 23–29)
CREAT SERPL-MCNC: 0.9 MG/DL (ref 0.5–1.4)
EST. GFR  (AFRICAN AMERICAN): >60 ML/MIN/1.73 M^2
EST. GFR  (NON AFRICAN AMERICAN): >60 ML/MIN/1.73 M^2
GAMMA GLOB SERPL ELPH-MCNC: 1.35 G/DL (ref 0.67–1.58)
GLUCOSE SERPL-MCNC: 92 MG/DL (ref 70–110)
HBV CORE IGM SERPL QL IA: NEGATIVE
HCV AB SERPL QL IA: NEGATIVE
HIV 1+2 AB+HIV1 P24 AG SERPL QL IA: NEGATIVE
PATHOLOGIST INTERPRETATION SPE: NORMAL
POTASSIUM SERPL-SCNC: 4.1 MMOL/L (ref 3.5–5.1)
PROT SERPL-MCNC: 7.6 G/DL (ref 6–8.4)
PROT SERPL-MCNC: 8 G/DL (ref 6–8.4)
RPR SER QL: NORMAL
SODIUM SERPL-SCNC: 140 MMOL/L (ref 136–145)

## 2021-06-30 LAB
ANTI SM ANTIBODY: 0.06 RATIO (ref 0–0.99)
ANTI SM/RNP ANTIBODY: 0.07 RATIO (ref 0–0.99)
ANTI-SM INTERPRETATION: NEGATIVE
ANTI-SM/RNP INTERPRETATION: NEGATIVE
ANTI-SSA ANTIBODY: 0.06 RATIO (ref 0–0.99)
ANTI-SSA INTERPRETATION: NEGATIVE
ANTI-SSB ANTIBODY: 0.06 RATIO (ref 0–0.99)
ANTI-SSB INTERPRETATION: NEGATIVE
DSDNA AB SER-ACNC: NORMAL [IU]/ML
STRONGYLOIDES ANTIBODY IGG: NEGATIVE
TRYPTASE LEVEL: 6.1 NG/ML

## 2021-07-01 LAB — T CANIS IGG SER QL IA: NEGATIVE

## 2021-07-07 LAB
CU INDEX: 5.3
CU, ANTI-THYROGLOBULIN IGG: <20 IU/ML
CU, ANTI-THYROID PEROXIDASE IGG: 138 IU/ML
CU, TSH (THYROTROPIN): 0.47 UIU/ML (ref 0.4–4)

## 2021-07-13 DIAGNOSIS — L50.9 URTICARIA: Primary | ICD-10-CM

## 2021-07-16 DIAGNOSIS — L50.9 URTICARIA: Primary | ICD-10-CM

## 2021-07-19 ENCOUNTER — TELEPHONE (OUTPATIENT)
Dept: ALLERGY | Facility: CLINIC | Age: 76
End: 2021-07-19

## 2021-08-10 ENCOUNTER — OFFICE VISIT (OUTPATIENT)
Dept: ALLERGY | Facility: CLINIC | Age: 76
End: 2021-08-10
Payer: MEDICARE

## 2021-08-10 VITALS
HEART RATE: 70 BPM | DIASTOLIC BLOOD PRESSURE: 68 MMHG | SYSTOLIC BLOOD PRESSURE: 111 MMHG | WEIGHT: 180.13 LBS | TEMPERATURE: 98 F | BODY MASS INDEX: 30.01 KG/M2 | HEIGHT: 65 IN

## 2021-08-10 DIAGNOSIS — L30.9 DERMATITIS: ICD-10-CM

## 2021-08-10 DIAGNOSIS — R76.8 THYROID ANTIBODY POSITIVE: Primary | ICD-10-CM

## 2021-08-10 DIAGNOSIS — L29.9 PRURITUS: ICD-10-CM

## 2021-08-10 PROCEDURE — 99999 PR PBB SHADOW E&M-EST. PATIENT-LVL IV: ICD-10-PCS | Mod: PBBFAC,,, | Performed by: ALLERGY & IMMUNOLOGY

## 2021-08-10 PROCEDURE — 99214 PR OFFICE/OUTPT VISIT, EST, LEVL IV, 30-39 MIN: ICD-10-PCS | Mod: S$PBB,,, | Performed by: ALLERGY & IMMUNOLOGY

## 2021-08-10 PROCEDURE — 99214 OFFICE O/P EST MOD 30 MIN: CPT | Mod: S$PBB,,, | Performed by: ALLERGY & IMMUNOLOGY

## 2021-08-10 PROCEDURE — 99999 PR PBB SHADOW E&M-EST. PATIENT-LVL IV: CPT | Mod: PBBFAC,,, | Performed by: ALLERGY & IMMUNOLOGY

## 2021-08-10 PROCEDURE — 99214 OFFICE O/P EST MOD 30 MIN: CPT | Mod: PBBFAC | Performed by: ALLERGY & IMMUNOLOGY

## 2021-08-10 RX ORDER — MONTELUKAST SODIUM 10 MG/1
TABLET ORAL
COMMUNITY
Start: 2021-07-29 | End: 2021-08-10 | Stop reason: SDUPTHER

## 2021-08-10 RX ORDER — LEVOCETIRIZINE DIHYDROCHLORIDE 5 MG/1
5 TABLET, FILM COATED ORAL NIGHTLY
Qty: 30 TABLET | Refills: 5 | Status: SHIPPED | OUTPATIENT
Start: 2021-08-10 | End: 2022-08-01

## 2021-08-10 RX ORDER — LANSOPRAZOLE 30 MG/1
30 CAPSULE, DELAYED RELEASE ORAL
COMMUNITY
Start: 2021-07-07

## 2021-08-10 RX ORDER — MONTELUKAST SODIUM 10 MG/1
10 TABLET ORAL DAILY
Qty: 30 TABLET | Refills: 5 | Status: SHIPPED | OUTPATIENT
Start: 2021-08-10 | End: 2022-05-17

## 2021-08-10 RX ORDER — PITAVASTATIN CALCIUM 4.18 MG/1
1 TABLET, FILM COATED ORAL DAILY
COMMUNITY
Start: 2021-06-29

## 2021-12-16 ENCOUNTER — OFFICE VISIT (OUTPATIENT)
Dept: UROLOGY | Facility: CLINIC | Age: 76
End: 2021-12-16
Payer: MEDICARE

## 2021-12-16 VITALS
SYSTOLIC BLOOD PRESSURE: 136 MMHG | WEIGHT: 189.63 LBS | BODY MASS INDEX: 31.55 KG/M2 | DIASTOLIC BLOOD PRESSURE: 70 MMHG

## 2021-12-16 DIAGNOSIS — C67.9 MALIGNANT NEOPLASM OF URINARY BLADDER, UNSPECIFIED SITE: Primary | ICD-10-CM

## 2021-12-16 DIAGNOSIS — N28.1 RENAL CYST: ICD-10-CM

## 2021-12-16 LAB
BILIRUB SERPL-MCNC: NORMAL MG/DL
BLOOD URINE, POC: NORMAL
CLARITY, POC UA: CLEAR
COLOR, POC UA: YELLOW
GLUCOSE UR QL STRIP: NORMAL
KETONES UR QL STRIP: NORMAL
LEUKOCYTE ESTERASE URINE, POC: NORMAL
NITRITE, POC UA: NORMAL
PH, POC UA: 7
PROTEIN, POC: NORMAL
SPECIFIC GRAVITY, POC UA: 1.01
UROBILINOGEN, POC UA: NORMAL

## 2021-12-16 PROCEDURE — 99999 PR PBB SHADOW E&M-EST. PATIENT-LVL IV: ICD-10-PCS | Mod: PBBFAC,,, | Performed by: UROLOGY

## 2021-12-16 PROCEDURE — 52000 PR CYSTOURETHROSCOPY: ICD-10-PCS | Mod: S$PBB,,, | Performed by: UROLOGY

## 2021-12-16 PROCEDURE — 88112 PR  CYTOPATH, CELL ENHANCE TECH: ICD-10-PCS | Mod: 26,,, | Performed by: STUDENT IN AN ORGANIZED HEALTH CARE EDUCATION/TRAINING PROGRAM

## 2021-12-16 PROCEDURE — 99999 PR PBB SHADOW E&M-EST. PATIENT-LVL IV: CPT | Mod: PBBFAC,,, | Performed by: UROLOGY

## 2021-12-16 PROCEDURE — 99499 NO LOS: ICD-10-PCS | Mod: S$PBB,,, | Performed by: UROLOGY

## 2021-12-16 PROCEDURE — 52000 CYSTOURETHROSCOPY: CPT | Mod: PBBFAC,PO | Performed by: UROLOGY

## 2021-12-16 PROCEDURE — 52000 CYSTOURETHROSCOPY: CPT | Mod: S$PBB,,, | Performed by: UROLOGY

## 2021-12-16 PROCEDURE — 99499 UNLISTED E&M SERVICE: CPT | Mod: S$PBB,,, | Performed by: UROLOGY

## 2021-12-16 PROCEDURE — 81002 URINALYSIS NONAUTO W/O SCOPE: CPT | Mod: PBBFAC,PO | Performed by: UROLOGY

## 2021-12-16 PROCEDURE — 88112 CYTOPATH CELL ENHANCE TECH: CPT | Performed by: STUDENT IN AN ORGANIZED HEALTH CARE EDUCATION/TRAINING PROGRAM

## 2021-12-16 PROCEDURE — 88112 CYTOPATH CELL ENHANCE TECH: CPT | Mod: 26,,, | Performed by: STUDENT IN AN ORGANIZED HEALTH CARE EDUCATION/TRAINING PROGRAM

## 2021-12-16 PROCEDURE — 99214 OFFICE O/P EST MOD 30 MIN: CPT | Mod: PBBFAC,PO,25 | Performed by: UROLOGY

## 2021-12-16 RX ORDER — LIDOCAINE HYDROCHLORIDE 20 MG/ML
JELLY TOPICAL
Status: SHIPPED | OUTPATIENT
Start: 2021-12-16

## 2021-12-16 RX ORDER — CIPROFLOXACIN 500 MG/1
500 TABLET ORAL
Status: COMPLETED | OUTPATIENT
Start: 2021-12-16 | End: 2021-12-16

## 2021-12-16 RX ADMIN — CIPROFLOXACIN 500 MG: 500 TABLET ORAL at 09:12

## 2021-12-20 LAB
FINAL PATHOLOGIC DIAGNOSIS: NORMAL
Lab: NORMAL
MICROSCOPIC EXAM: NORMAL

## 2021-12-27 ENCOUNTER — HOSPITAL ENCOUNTER (OUTPATIENT)
Dept: RADIOLOGY | Facility: HOSPITAL | Age: 76
Discharge: HOME | End: 2021-12-27
Attending: UROLOGY
Payer: MEDICARE

## 2021-12-27 ENCOUNTER — TELEPHONE (OUTPATIENT)
Dept: UROLOGY | Facility: CLINIC | Age: 76
End: 2021-12-27
Payer: MEDICARE

## 2021-12-27 DIAGNOSIS — C67.9 MALIGNANT NEOPLASM OF URINARY BLADDER, UNSPECIFIED SITE: ICD-10-CM

## 2021-12-27 PROCEDURE — 25500020 PHARM REV CODE 255: Performed by: UROLOGY

## 2021-12-27 PROCEDURE — 74178 CT UROGRAM ABD PELVIS W WO: ICD-10-PCS | Mod: 26,,, | Performed by: RADIOLOGY

## 2021-12-27 PROCEDURE — 74178 CT ABD&PLV WO CNTR FLWD CNTR: CPT | Mod: TC

## 2021-12-27 PROCEDURE — 74178 CT ABD&PLV WO CNTR FLWD CNTR: CPT | Mod: 26,,, | Performed by: RADIOLOGY

## 2021-12-27 RX ADMIN — IOHEXOL 100 ML: 350 INJECTION, SOLUTION INTRAVENOUS at 03:12

## 2022-02-15 ENCOUNTER — TELEPHONE (OUTPATIENT)
Dept: UROLOGY | Facility: CLINIC | Age: 77
End: 2022-02-15
Payer: MEDICARE

## 2022-02-15 NOTE — TELEPHONE ENCOUNTER
----- Message from Radha Tuttle sent at 2/15/2022  2:02 PM CST -----  Contact: Porsha  Patient would like a call back at [phone]  in regards to getting her results.    Thanks

## 2022-02-16 ENCOUNTER — TELEPHONE (OUTPATIENT)
Dept: UROLOGY | Facility: CLINIC | Age: 77
End: 2022-02-16
Payer: MEDICARE

## 2022-02-16 NOTE — TELEPHONE ENCOUNTER
Called and spoke with pt, informed her that per Dr Terrazas, pt Ct scan was normal and her urine culture came back fine. Pt verbalized understanding.

## 2022-02-16 NOTE — TELEPHONE ENCOUNTER
----- Message from Patel Poe sent at 2/16/2022  8:56 AM CST -----  Contact: [phone]  Patient would like to consult with a nurse in regards to her test results. Please call back at [phone]. Thanks r/s

## 2022-05-11 ENCOUNTER — OFFICE VISIT (OUTPATIENT)
Dept: ALLERGY | Facility: CLINIC | Age: 77
End: 2022-05-11
Payer: MEDICARE

## 2022-05-11 ENCOUNTER — LAB VISIT (OUTPATIENT)
Dept: LAB | Facility: HOSPITAL | Age: 77
End: 2022-05-11
Attending: ALLERGY & IMMUNOLOGY
Payer: MEDICARE

## 2022-05-11 VITALS
SYSTOLIC BLOOD PRESSURE: 113 MMHG | DIASTOLIC BLOOD PRESSURE: 68 MMHG | BODY MASS INDEX: 30.97 KG/M2 | HEIGHT: 66 IN | HEART RATE: 90 BPM | WEIGHT: 192.69 LBS

## 2022-05-11 DIAGNOSIS — L29.9 PRURITUS: ICD-10-CM

## 2022-05-11 DIAGNOSIS — R76.8 THYROID ANTIBODY POSITIVE: ICD-10-CM

## 2022-05-11 DIAGNOSIS — R21 RASH: ICD-10-CM

## 2022-05-11 DIAGNOSIS — L29.9 PRURITUS: Primary | ICD-10-CM

## 2022-05-11 PROCEDURE — 99999 PR PBB SHADOW E&M-EST. PATIENT-LVL III: CPT | Mod: PBBFAC,,, | Performed by: ALLERGY & IMMUNOLOGY

## 2022-05-11 PROCEDURE — 84443 ASSAY THYROID STIM HORMONE: CPT | Performed by: ALLERGY & IMMUNOLOGY

## 2022-05-11 PROCEDURE — 99214 OFFICE O/P EST MOD 30 MIN: CPT | Mod: S$PBB,,, | Performed by: ALLERGY & IMMUNOLOGY

## 2022-05-11 PROCEDURE — 85025 COMPLETE CBC W/AUTO DIFF WBC: CPT | Performed by: ALLERGY & IMMUNOLOGY

## 2022-05-11 PROCEDURE — 80053 COMPREHEN METABOLIC PANEL: CPT | Performed by: ALLERGY & IMMUNOLOGY

## 2022-05-11 PROCEDURE — 99214 PR OFFICE/OUTPT VISIT, EST, LEVL IV, 30-39 MIN: ICD-10-PCS | Mod: S$PBB,,, | Performed by: ALLERGY & IMMUNOLOGY

## 2022-05-11 PROCEDURE — 99213 OFFICE O/P EST LOW 20 MIN: CPT | Mod: PBBFAC | Performed by: ALLERGY & IMMUNOLOGY

## 2022-05-11 PROCEDURE — 99999 PR PBB SHADOW E&M-EST. PATIENT-LVL III: ICD-10-PCS | Mod: PBBFAC,,, | Performed by: ALLERGY & IMMUNOLOGY

## 2022-05-11 PROCEDURE — 84439 ASSAY OF FREE THYROXINE: CPT | Performed by: ALLERGY & IMMUNOLOGY

## 2022-05-11 RX ORDER — MINERAL OIL
180 ENEMA (ML) RECTAL DAILY
Qty: 30 TABLET | Refills: 11 | Status: SHIPPED | OUTPATIENT
Start: 2022-05-11 | End: 2022-08-01

## 2022-05-11 RX ORDER — BACLOFEN 10 MG/1
10-20 TABLET ORAL NIGHTLY PRN
COMMUNITY
Start: 2022-04-20

## 2022-05-11 RX ORDER — CYCLOSPORINE 0.5 MG/ML
EMULSION OPHTHALMIC
COMMUNITY
Start: 2021-12-07

## 2022-05-11 RX ORDER — TRIAMTERENE/HYDROCHLOROTHIAZID 37.5-25 MG
1 TABLET ORAL DAILY
COMMUNITY
Start: 2022-03-22

## 2022-05-11 RX ORDER — VALSARTAN 160 MG/1
160 TABLET ORAL DAILY
COMMUNITY
Start: 2022-04-20

## 2022-05-11 RX ORDER — METFORMIN HYDROCHLORIDE 500 MG/1
TABLET ORAL
COMMUNITY
Start: 2021-12-21

## 2022-05-11 NOTE — PROGRESS NOTES
"Subjective:       Patient ID: Porsha Adams is a 77 y.o. female.    Chief Complaint:  Follow-up      HPI 6/28/2021: 76 year old female complaining of itching "for a long time". She reports that a steroid injection, which helped. She reports a hypopigmented rash. She reports erythema as well. She denies hives. She reports symptoms for 3-4 months.   She is not taking antihistamines.   She denies taking percocets.    HPI 8/10/2021:  She is here for follow up. She reports that itching has subsided, since she started medications prescribed during her previous visit.  She denies a history of thyroid disease.      HPI today: 77 year old female with a history of TPO positive and Pruritus that responded to treatment previously- Zyrtec and Singulair. She reports that itching returned three weeks ago. She is taking Zyrtec 10 mg  and Singulair. She denies a rash. She denies tongue or throat swelling.          Past Medical History:   Diagnosis Date    ASHD (arteriosclerotic heart disease)     Bladder cancer     Chronic back pain     Colon polyp     Diabetes     GERD (gastroesophageal reflux disease)     Heart attack     HTN (hypertension)     Hyperlipidemia     Kidney stones      Family History   Problem Relation Age of Onset    Hypertension Mother     Hyperlipidemia Mother     Arthritis Father     Hypertension Father     Stroke Sister     Diabetes Sister     Hyperlipidemia Sister     Cancer Brother     Diabetes Brother     Diabetes Son      Current Outpatient Medications on File Prior to Visit   Medication Sig Dispense Refill    acetaminophen-codeine 300-30mg (TYLENOL #3) 300-30 mg Tab TAKE 1 TABLET BY MOUTH EVERY 4 HOURS AS NEEDED FOR PAIN FOR UP TO 10 DAYS      amLODIPine (NORVASC) 10 MG tablet Take 10 mg by mouth once daily.      aspirin (ECOTRIN) 81 MG EC tablet Take 81 mg by mouth once daily.      baclofen (LIORESAL) 10 MG tablet Take 10-20 mg by mouth nightly as needed.      carvediloL (COREG) " 6.25 MG tablet Take 1 tablet by mouth 2 (two) times daily with meals      cetirizine (ZYRTEC) 1 mg/mL syrup Take 10 mLs (10 mg total) by mouth once daily. 300 mL 11    clopidogreL (PLAVIX) 75 mg tablet Take 75 mg by mouth.      cyclobenzaprine (FLEXERIL) 5 MG tablet Take 5 mg by mouth 3 (three) times daily as needed.      DULoxetine (CYMBALTA) 60 MG capsule Take 60 mg by mouth once daily.      fluocinonide 0.05% (LIDEX) 0.05 % cream Apply topically.      gabapentin (NEURONTIN) 100 MG capsule       HYDROcodone-acetaminophen (NORCO) 7.5-325 mg per tablet Take 1 tablet by mouth every 6 (six) hours as needed.      lansoprazole (PREVACID) 30 MG capsule Take 30 mg by mouth.      levocetirizine (XYZAL) 5 MG tablet Take 1 tablet (5 mg total) by mouth every evening. 30 tablet 5    linaCLOtide (LINZESS) 145 mcg Cap capsule TAKE 1 CAPSULE BY MOUTH ONCE DAILY IN THE MORNING BEFORE BREAKFAST      LIVALO 4 mg Tab Take 1 tablet by mouth once daily.      metFORMIN (GLUCOPHAGE) 500 MG tablet       montelukast (SINGULAIR) 10 mg tablet Take 1 tablet (10 mg total) by mouth once daily. 30 tablet 5    multivitamin capsule Take 1 capsule by mouth.      ondansetron (ZOFRAN-ODT) 4 MG TbDL Take 4 mg by mouth every 8 (eight) hours as needed.      oxyCODONE-acetaminophen (PERCOCET)  mg per tablet Take 1 tablet by mouth every 4 (four) hours as needed.      pantoprazole (PROTONIX) 40 MG tablet Take 40 mg by mouth.      pimecrolimus (ELIDEL) 1 % cream Apply topically 2 (two) times daily. 100 g 1    potassium chloride SA (K-DUR,KLOR-CON) 20 MEQ tablet Take 1 tablet by mouth 2 (two) daily      RESTASIS 0.05 % ophthalmic emulsion       sucralfate (CARAFATE) 1 gram tablet Take 1 g by mouth 4 (four) times daily.      triamterene-hydrochlorothiazide 37.5-25 mg (MAXZIDE-25) 37.5-25 mg per tablet Take 1 tablet by mouth once daily.      valsartan (DIOVAN) 160 MG tablet Take 160 mg by mouth once daily.      [DISCONTINUED]  losartan-hydrochlorothiazide 100-25 mg (HYZAAR) 100-25 mg per tablet Take 1 tablet by mouth.       Current Facility-Administered Medications on File Prior to Visit   Medication Dose Route Frequency Provider Last Rate Last Admin    LIDOcaine HCl 2% urojet   Mucous Membrane 1 time in Clinic/HOD Suma Terrazas MD         Review of patient's allergies indicates:   Allergen Reactions    Hydrocodone     Prednisone Other (See Comments)     Causes anxiety    Atorvastatin      Muscle spasm    Latex      Added based on information entered during case entry, please review and add reactions, type, and severity as needed       Environmental History: No pets She does not smoke.  Review of Systems   Constitutional: Negative for chills and fever.   HENT: Negative for congestion and sore throat.         Runny nose   Eyes: Negative for discharge and redness.   Respiratory: Positive for cough. Negative for shortness of breath and wheezing.    Cardiovascular: Positive for leg swelling. Negative for chest pain.   Gastrointestinal: Negative for heartburn, nausea and vomiting.   Genitourinary: Negative for dysuria and urgency.   Musculoskeletal: Negative for back pain and joint pain.   Skin: Positive for itching. Negative for rash.   Neurological: Negative for dizziness and speech change.   Endo/Heme/Allergies: Positive for environmental allergies. Does not bruise/bleed easily.   Psychiatric/Behavioral: Negative for depression and suicidal ideas.          Objective:    Physical Exam  Vitals reviewed.   Constitutional:       General: She is not in acute distress.     Appearance: Normal appearance. She is well-developed. She is not ill-appearing, toxic-appearing or diaphoretic.   HENT:      Head: Normocephalic and atraumatic.      Right Ear: Tympanic membrane, ear canal and external ear normal. There is no impacted cerumen.      Left Ear: Tympanic membrane, ear canal and external ear normal. There is no impacted cerumen.      Nose:  Nose normal. No congestion or rhinorrhea.      Mouth/Throat:      Pharynx: No oropharyngeal exudate or posterior oropharyngeal erythema.   Eyes:      General: No scleral icterus.        Right eye: No discharge.         Left eye: No discharge.      Pupils: Pupils are equal, round, and reactive to light.   Neck:      Thyroid: No thyromegaly.   Cardiovascular:      Rate and Rhythm: Normal rate and regular rhythm.      Heart sounds: Normal heart sounds. No murmur heard.   No friction rub. No gallop.    Pulmonary:      Effort: Pulmonary effort is normal. No respiratory distress.      Breath sounds: Normal breath sounds. No stridor. No wheezing, rhonchi or rales.   Chest:      Chest wall: No tenderness.     Musculoskeletal:         General: No swelling, tenderness, deformity or signs of injury. Normal range of motion.      Cervical back: Normal range of motion and neck supple. No rigidity or tenderness. No muscular tenderness.      Right lower leg: No edema.      Left lower leg: No edema.   Lymphadenopathy:      Cervical: No cervical adenopathy.   Skin:     General: Skin is warm.      Coloration: Skin is not jaundiced or pale.      Findings: no bruising, hyperpigmented lesions on there upper cheeks  Neurological:      Mental Status: She is alert and oriented to person, place, and time.      Gait: Gait normal.   Psychiatric:         Mood and Affect: Mood normal.         Behavior: Behavior normal.         Thought Content: Thought content normal.         Judgment: Judgment normal.           Assessment:       1. Pruritus    2. Thyroid antibody positive    3. Rash         Plan:       Pruritus  -     Comprehensive Metabolic Panel; Future; Expected date: 05/11/2022  -     CBC Auto Differential; Future; Expected date: 05/11/2022  -     THYROID PEROXIDASE ANTIBODY; Future; Expected date: 05/11/2022  -     THYROGLOBULIN; Future; Expected date: 05/11/2022  -     TSH; Future; Expected date: 05/11/2022  -     T4, FREE; Future;  Expected date: 05/11/2022  -     fexofenadine (ALLEGRA) 180 MG tablet; Take 1 tablet (180 mg total) by mouth once daily.  Dispense: 30 tablet; Refill: 11    Thyroid antibody positive  -     THYROID PEROXIDASE ANTIBODY; Future; Expected date: 05/11/2022  -     THYROGLOBULIN; Future; Expected date: 05/11/2022  -     TSH; Future; Expected date: 05/11/2022  -     T4, FREE; Future; Expected date: 05/11/2022    Rash  -     Ambulatory referral/consult to Dermatology; Future; Expected date: 05/18/2022    Continue Zyrtec and Singulair. Adding Allegra in the morning.  RTC 4-6 weeks or sooner, if needed.    BRITANY ZENG spent a total of 30 minutes on the day of the visit.  This includes face to face time and non-face to face time preparing to see the patient (eg, review of tests), obtaining and/or reviewing separately obtained history, documenting clinical information in the electronic or other health record, independently interpreting results and communicating results to the patient/family/caregiver, or care coordinator.

## 2022-05-12 LAB
ALBUMIN SERPL BCP-MCNC: 3.7 G/DL (ref 3.5–5.2)
ALP SERPL-CCNC: 48 U/L (ref 55–135)
ALT SERPL W/O P-5'-P-CCNC: <5 U/L (ref 10–44)
ANION GAP SERPL CALC-SCNC: 10 MMOL/L (ref 8–16)
AST SERPL-CCNC: 12 U/L (ref 10–40)
BASOPHILS # BLD AUTO: 0.03 K/UL (ref 0–0.2)
BASOPHILS NFR BLD: 0.5 % (ref 0–1.9)
BILIRUB SERPL-MCNC: 0.3 MG/DL (ref 0.1–1)
BUN SERPL-MCNC: 13 MG/DL (ref 8–23)
CALCIUM SERPL-MCNC: 9.5 MG/DL (ref 8.7–10.5)
CHLORIDE SERPL-SCNC: 104 MMOL/L (ref 95–110)
CO2 SERPL-SCNC: 27 MMOL/L (ref 23–29)
CREAT SERPL-MCNC: 0.9 MG/DL (ref 0.5–1.4)
DIFFERENTIAL METHOD: ABNORMAL
EOSINOPHIL # BLD AUTO: 0.2 K/UL (ref 0–0.5)
EOSINOPHIL NFR BLD: 2.9 % (ref 0–8)
ERYTHROCYTE [DISTWIDTH] IN BLOOD BY AUTOMATED COUNT: 13.9 % (ref 11.5–14.5)
EST. GFR  (AFRICAN AMERICAN): >60 ML/MIN/1.73 M^2
EST. GFR  (NON AFRICAN AMERICAN): >60 ML/MIN/1.73 M^2
GLUCOSE SERPL-MCNC: 158 MG/DL (ref 70–110)
HCT VFR BLD AUTO: 34.7 % (ref 37–48.5)
HGB BLD-MCNC: 10.7 G/DL (ref 12–16)
IMM GRANULOCYTES # BLD AUTO: 0.02 K/UL (ref 0–0.04)
IMM GRANULOCYTES NFR BLD AUTO: 0.3 % (ref 0–0.5)
LYMPHOCYTES # BLD AUTO: 2.1 K/UL (ref 1–4.8)
LYMPHOCYTES NFR BLD: 32.5 % (ref 18–48)
MCH RBC QN AUTO: 27.1 PG (ref 27–31)
MCHC RBC AUTO-ENTMCNC: 30.8 G/DL (ref 32–36)
MCV RBC AUTO: 88 FL (ref 82–98)
MONOCYTES # BLD AUTO: 0.7 K/UL (ref 0.3–1)
MONOCYTES NFR BLD: 9.9 % (ref 4–15)
NEUTROPHILS # BLD AUTO: 3.5 K/UL (ref 1.8–7.7)
NEUTROPHILS NFR BLD: 53.9 % (ref 38–73)
NRBC BLD-RTO: 0 /100 WBC
PLATELET # BLD AUTO: 319 K/UL (ref 150–450)
PMV BLD AUTO: 10.1 FL (ref 9.2–12.9)
POTASSIUM SERPL-SCNC: 4.3 MMOL/L (ref 3.5–5.1)
PROT SERPL-MCNC: 6.9 G/DL (ref 6–8.4)
RBC # BLD AUTO: 3.95 M/UL (ref 4–5.4)
SODIUM SERPL-SCNC: 141 MMOL/L (ref 136–145)
T4 FREE SERPL-MCNC: 0.74 NG/DL (ref 0.71–1.51)
THYROPEROXIDASE IGG SERPL-ACNC: 143 IU/ML
TSH SERPL DL<=0.005 MIU/L-ACNC: 0.73 UIU/ML (ref 0.4–4)
WBC # BLD AUTO: 6.55 K/UL (ref 3.9–12.7)

## 2022-05-13 LAB
THRYOGLOBULIN INTERPRETATION: ABNORMAL
THYROGLOB AB SERPL-ACNC: <1.8 IU/ML
THYROGLOB SERPL-MCNC: 8.8 NG/ML

## 2022-08-01 ENCOUNTER — HOSPITAL ENCOUNTER (OUTPATIENT)
Dept: RADIOLOGY | Facility: HOSPITAL | Age: 77
Discharge: HOME | End: 2022-08-01
Attending: DERMATOLOGY
Payer: MEDICARE

## 2022-08-01 ENCOUNTER — OFFICE VISIT (OUTPATIENT)
Dept: DERMATOLOGY | Facility: CLINIC | Age: 77
End: 2022-08-01
Payer: MEDICARE

## 2022-08-01 DIAGNOSIS — L29.9 GENERALIZED PRURITUS: ICD-10-CM

## 2022-08-01 DIAGNOSIS — L29.9 GENERALIZED PRURITUS: Primary | ICD-10-CM

## 2022-08-01 DIAGNOSIS — M79.18 MYALGIA, OTHER SITE: ICD-10-CM

## 2022-08-01 PROCEDURE — 71046 XR CHEST PA AND LATERAL: ICD-10-PCS | Mod: 26,,, | Performed by: RADIOLOGY

## 2022-08-01 PROCEDURE — 99214 OFFICE O/P EST MOD 30 MIN: CPT | Mod: PBBFAC,25,PO | Performed by: DERMATOLOGY

## 2022-08-01 PROCEDURE — 99214 OFFICE O/P EST MOD 30 MIN: CPT | Mod: S$PBB,,, | Performed by: DERMATOLOGY

## 2022-08-01 PROCEDURE — 99999 PR PBB SHADOW E&M-EST. PATIENT-LVL IV: CPT | Mod: PBBFAC,,, | Performed by: DERMATOLOGY

## 2022-08-01 PROCEDURE — 99214 PR OFFICE/OUTPT VISIT, EST, LEVL IV, 30-39 MIN: ICD-10-PCS | Mod: S$PBB,,, | Performed by: DERMATOLOGY

## 2022-08-01 PROCEDURE — 71046 X-RAY EXAM CHEST 2 VIEWS: CPT | Mod: 26,,, | Performed by: RADIOLOGY

## 2022-08-01 PROCEDURE — 99999 PR PBB SHADOW E&M-EST. PATIENT-LVL IV: ICD-10-PCS | Mod: PBBFAC,,, | Performed by: DERMATOLOGY

## 2022-08-01 PROCEDURE — 71046 X-RAY EXAM CHEST 2 VIEWS: CPT | Mod: TC,FY,PO

## 2022-08-01 RX ORDER — LEVOCETIRIZINE DIHYDROCHLORIDE 5 MG/1
TABLET, FILM COATED ORAL
Qty: 30 TABLET | Refills: 11 | Status: SHIPPED | OUTPATIENT
Start: 2022-08-01

## 2022-08-01 RX ORDER — HYDROXYZINE HYDROCHLORIDE 10 MG/1
10 TABLET, FILM COATED ORAL NIGHTLY PRN
Qty: 30 TABLET | Refills: 3 | Status: SHIPPED | OUTPATIENT
Start: 2022-08-01 | End: 2022-08-31

## 2022-08-01 RX ORDER — HYDROCORTISONE ACETATE, PRAMOXINE HCL 2.5; 1 G/100G; G/100G
CREAM TOPICAL
Qty: 57 G | Refills: 3 | Status: SHIPPED | OUTPATIENT
Start: 2022-08-01 | End: 2023-01-18 | Stop reason: SDUPTHER

## 2022-08-01 NOTE — PROGRESS NOTES
Subjective:       Patient ID:  Porsha Adams is a 77 y.o. female who presents for   Chief Complaint   Patient presents with    Itching     All over body ; pt saw allergist and was referral to us. Pt was giving Pimecrolimus cream by allergist       Hx of generalized pruritus, hx of chronic back pain, + LUKE 1:160 7/19/22 (followed by Lisha in rheum at [facility]), + thyroid autoantibodies (anti-TPO), last seen on 6/15/21 by Dr. Moreno.  Also followed by Dr. Zepeda.  She c/o intense pruritus daily, intermittent 10/10 in severity.  Currently using pimecrolimus.     Currently on cymbalta for nerve pain/neuropathy.      Review of Systems   Constitutional: Negative for fever and chills.   Gastrointestinal: Negative for nausea and vomiting.   Skin: Positive for itching and activity-related sunscreen use. Negative for daily sunscreen use and recent sunburn.   Hematologic/Lymphatic: Does not bruise/bleed easily.        Objective:    Physical Exam   Constitutional: She appears well-developed and well-nourished. No distress.   Neurological: She is alert and oriented to person, place, and time. She is not disoriented.   Psychiatric: She has a normal mood and affect.   Skin:   Areas Examined (abnormalities noted in diagram):   Head / Face Inspection Performed  Neck Inspection Performed  Chest / Axilla Inspection Performed  Back Inspection Performed  RUE Inspected  LUE Inspection Performed                Assessment / Plan:        Generalized pruritus  -     CMP; Future; Expected date: 08/01/2022  -     TSH; Future; Expected date: 08/01/2022  -     Vitamin D; Future; Expected date: 08/01/2022  -     RPR; Future; Expected date: 08/01/2022  -     HIV 1/2 Ag/Ab (4th Gen); Future; Expected date: 08/01/2022  -     Hepatitis C antibody; Future; Expected date: 08/01/2022  -     Hepatitis B surface antibody; Future; Expected date: 08/01/2022  -     Hepatitis B surface antigen; Future; Expected date: 08/01/2022  -     Protein  electrophoresis, serum; Future; Expected date: 08/01/2022  -     Chest X-Ray (PA & Lateral); Future; Expected date: 08/01/2022  -     levocetirizine (XYZAL) 5 MG tablet; Take 1 tablet each morning.  Dispense: 30 tablet; Refill: 11  -     hydrOXYzine HCL (ATARAX) 10 MG Tab; Take 1 tablet (10 mg total) by mouth nightly as needed (for itching). May cause drowsiness.  Do not drive or operate heavy machinery.  Dispense: 30 tablet; Refill: 3  -     pramoxine-hydrocortisone cream; AAA TID  Dispense: 57 g; Refill: 3  -     Will start w/u for pruritus given persistent intense itching.  Will start above meds.     Myalgia, other site   -     Vitamin D; Future; Expected date: 08/01/2022             Follow up in about 3 months (around 11/1/2022).

## 2022-09-29 ENCOUNTER — OFFICE VISIT (OUTPATIENT)
Dept: DERMATOLOGY | Facility: CLINIC | Age: 77
End: 2022-09-29
Payer: MEDICARE

## 2022-09-29 DIAGNOSIS — L29.9 GENERALIZED PRURITUS: Primary | ICD-10-CM

## 2022-09-29 DIAGNOSIS — L98.9 DERMATOSIS: ICD-10-CM

## 2022-09-29 PROCEDURE — 99213 PR OFFICE/OUTPT VISIT, EST, LEVL III, 20-29 MIN: ICD-10-PCS | Mod: S$PBB,,, | Performed by: DERMATOLOGY

## 2022-09-29 PROCEDURE — 99212 OFFICE O/P EST SF 10 MIN: CPT | Mod: PBBFAC | Performed by: DERMATOLOGY

## 2022-09-29 PROCEDURE — 99999 PR PBB SHADOW E&M-EST. PATIENT-LVL II: ICD-10-PCS | Mod: PBBFAC,,, | Performed by: DERMATOLOGY

## 2022-09-29 PROCEDURE — 99213 OFFICE O/P EST LOW 20 MIN: CPT | Mod: S$PBB,,, | Performed by: DERMATOLOGY

## 2022-09-29 PROCEDURE — 99999 PR PBB SHADOW E&M-EST. PATIENT-LVL II: CPT | Mod: PBBFAC,,, | Performed by: DERMATOLOGY

## 2022-09-29 NOTE — PROGRESS NOTES
Subjective:       Patient ID:  Porsha Adams is a 77 y.o. female who presents for   Chief Complaint   Patient presents with    Rash     Follow up; slight improvement ; itching ; medication refills       Hx of generalized pruritus, hx of chronic back pain, + LUKE 1:160, Anti-RNP, 7/19/22 (followed by Lisha in rheum at [facility]), + thyroid autoantibodies (anti-TPO), last seen on 8/1/22.  She states pruritus is severe, 10/10 in severity.  Denies improvement. Currently using xyzal, hydroxyzine, and pramoxine-HTC.     Currently on cymbalta for nerve pain/neuropathy.    Pruritus w/u and CXR negative, except for mild decrease in TSH.       Review of Systems   Constitutional:  Negative for fever and chills.   Gastrointestinal:  Negative for nausea and vomiting.   Skin:  Positive for itching, rash and activity-related sunscreen use. Negative for daily sunscreen use and recent sunburn.   Hematologic/Lymphatic: Does not bruise/bleed easily.      Objective:    Physical Exam   Constitutional: She appears well-developed and well-nourished. No distress.   Neurological: She is alert and oriented to person, place, and time. She is not disoriented.   Psychiatric: She has a normal mood and affect.   Skin:   Areas Examined (abnormalities noted in diagram):   Head / Face Inspection Performed  Neck Inspection Performed  Chest / Axilla Inspection Performed  Abdomen Inspection Performed  Back Inspection Performed  RUE Inspected  LUE Inspection Performed  Nails and Digits Inspection Performed              Assessment / Plan:        Generalized pruritus  -     Ambulatory referral/consult to Neurology; Future; Expected date: 10/11/2022  -     Uncertain etiology.  Will refer to neuro for f/u.  Continue xyzal, hydroxyzine and pramoxine-HTC. Consider doxepin cream in the future.     Dermatosis           Follow up in about 2 weeks (around 10/13/2022).

## 2022-10-17 ENCOUNTER — OFFICE VISIT (OUTPATIENT)
Dept: DERMATOLOGY | Facility: CLINIC | Age: 77
End: 2022-10-17
Payer: MEDICARE

## 2022-10-17 DIAGNOSIS — L98.9 DERMATOSIS: Primary | ICD-10-CM

## 2022-10-17 PROCEDURE — 88305 TISSUE EXAM BY PATHOLOGIST: CPT | Mod: 26,,, | Performed by: PATHOLOGY

## 2022-10-17 PROCEDURE — 99212 OFFICE O/P EST SF 10 MIN: CPT | Mod: PBBFAC,PO,25 | Performed by: DERMATOLOGY

## 2022-10-17 PROCEDURE — 11105 PUNCH BX SKIN EA SEP/ADDL: CPT | Mod: S$PBB,,, | Performed by: DERMATOLOGY

## 2022-10-17 PROCEDURE — 99999 PR PBB SHADOW E&M-EST. PATIENT-LVL II: ICD-10-PCS | Mod: PBBFAC,,, | Performed by: DERMATOLOGY

## 2022-10-17 PROCEDURE — 88313 PR  SPECIAL STAINS,GROUP II: ICD-10-PCS | Mod: 26,,, | Performed by: PATHOLOGY

## 2022-10-17 PROCEDURE — 11105 PUNCH BX SKIN EA SEP/ADDL: CPT | Mod: PBBFAC,PO | Performed by: DERMATOLOGY

## 2022-10-17 PROCEDURE — 88313 SPECIAL STAINS GROUP 2: CPT | Performed by: PATHOLOGY

## 2022-10-17 PROCEDURE — 11105 PR PUNCH BIOPSY, SKIN, EA ADDTL LESION: ICD-10-PCS | Mod: S$PBB,,, | Performed by: DERMATOLOGY

## 2022-10-17 PROCEDURE — 11104 PUNCH BX SKIN SINGLE LESION: CPT | Mod: S$PBB,,, | Performed by: DERMATOLOGY

## 2022-10-17 PROCEDURE — 11104 PUNCH BX SKIN SINGLE LESION: CPT | Mod: PBBFAC,PO | Performed by: DERMATOLOGY

## 2022-10-17 PROCEDURE — 99213 OFFICE O/P EST LOW 20 MIN: CPT | Mod: 25,S$PBB,, | Performed by: DERMATOLOGY

## 2022-10-17 PROCEDURE — 88305 TISSUE EXAM BY PATHOLOGIST: ICD-10-PCS | Mod: 26,,, | Performed by: PATHOLOGY

## 2022-10-17 PROCEDURE — 99213 PR OFFICE/OUTPT VISIT, EST, LEVL III, 20-29 MIN: ICD-10-PCS | Mod: 25,S$PBB,, | Performed by: DERMATOLOGY

## 2022-10-17 PROCEDURE — 88305 TISSUE EXAM BY PATHOLOGIST: CPT | Performed by: PATHOLOGY

## 2022-10-17 PROCEDURE — 99999 PR PBB SHADOW E&M-EST. PATIENT-LVL II: CPT | Mod: PBBFAC,,, | Performed by: DERMATOLOGY

## 2022-10-17 PROCEDURE — 88313 SPECIAL STAINS GROUP 2: CPT | Mod: 26,,, | Performed by: PATHOLOGY

## 2022-10-17 PROCEDURE — 11104 PR PUNCH BIOPSY, SKIN, SINGLE LESION: ICD-10-PCS | Mod: S$PBB,,, | Performed by: DERMATOLOGY

## 2022-10-17 NOTE — PATIENT INSTRUCTIONS
Punch Biopsy Wound Care    Your doctor has performed a punch biopsy today.  A band aid and antibiotic ointment has been placed over the site.  This should remain in place for 24 hours.  It is recommended that you keep the area dry for the first 24 hours.  After 24 hours, you may remove the band aid and wash the area with warm soap and water and apply Vaseline jelly.  Many patients prefer to use Neosporin or Bacitracin ointment.  This is acceptable; however know that you can develop an allergy to this medication even if you have used it safely for years.  It is important to keep the area moist.  Letting it dry out and get air slows healing time, will worsen the scar, and make it more difficult to remove the stitches if they were placed.  Band aid is optional after first 24 hours.      If you notice increasing redness, tenderness, pain, or yellow drainage at the biopsy or surgical site, please notify your doctor.  These are signs of an infection.    If your biopsy/surgical site is bleeding, apply firm pressure for 15 minutes straight.  Repeat for another 15 minutes, if it is still bleeding.   If the surgical site continues to bleed, then please contact your doctor.      [facility] - [facility]   DERMATOLOGY  [address]   [place] [address]   Dept: [phone]   Dept Fax: [phone]

## 2022-10-17 NOTE — PROGRESS NOTES
Subjective:       Patient ID:  Porsha Adams is a 77 y.o. female who presents for   Chief Complaint   Patient presents with    Itching     Hx of generalized pruritus, hx of chronic back pain, + LUKE 1:160, Anti-RNP, 7/19/22 (followed by Lisha in rheum at [facility]), + thyroid autoantibodies (anti-TPO), last seen on 9/29/22.  She states pruritus is severe, 10/10 in severity.  Denies improvement. Currently using xyzal, hydroxyzine, and pramoxine-HTC.  She states rash has been persistent.      Currently on cymbalta for nerve pain/neuropathy.     Pruritus w/u and CXR negative, except for mild decrease in TSH.       Review of Systems   Constitutional:  Negative for fever and chills.   Gastrointestinal:  Negative for nausea and vomiting.   Skin:  Positive for itching, rash, dry skin and activity-related sunscreen use. Negative for daily sunscreen use and recent sunburn.   Hematologic/Lymphatic: Does not bruise/bleed easily.      Objective:    Physical Exam   Constitutional: She appears well-developed and well-nourished. No distress.   Neurological: She is alert and oriented to person, place, and time. She is not disoriented.   Psychiatric: She has a normal mood and affect.   Skin:   Areas Examined (abnormalities noted in diagram):   Head / Face Inspection Performed  Neck Inspection Performed  Back Inspection Performed  RUE Inspected  LUE Inspection Performed  Nails and Digits Inspection Performed                Assessment / Plan:      Pathology Orders:       Normal Orders This Visit    Specimen to Pathology, Dermatology     Questions:    Procedure Type: Dermatology and skin neoplasms    Number of Specimens: 2    ------------------------: -------------------------    Spec 1 Procedure: Biopsy    Spec 1 Clinical Impression: r/o SLE vs. SCLE vs. CTCL    Spec 1 Source: left forearm A    ------------------------: -------------------------    Spec 2 Procedure: Biopsy    Spec 2 Clinical Impression: r/o SLE vs. SCLE vs. CTCL     Spec 2 Source: left forearm B    Clinical Information: see above    Release to patient: Immediate          Dermatosis  -     Specimen to Pathology, Dermatology  -     2 punch biopsies done.  RTC in 3 weeks for results.           Follow up in about 3 weeks (around 11/7/2022).    PROCEDURE NOTE -- PUNCH BIOPSY  Location: see above    After risks, benefits, and alternatives were discussed with the patient, the patient agrees to the procedure by verbal consent. The area(s) is cleansed with alcohol. A total of 3 cc of lidocaine 1% with epinephrine and sodium bicarbonate was injected for local anesthesia. A 3 mm punch biopsy was used to remove part or all of the lesion(s). The specimen was sent for tissue pathology. The defect(s) was then packed with gelfoam. The area was dressed with antibiotic ointment and bandaged. The patient tolerated the procedure well without adverse events.  Wound care instructions were given to the patient on the AVS.  The patient will be notified of pathology results once available. Results will also be available in Epic.

## 2022-10-24 LAB
FINAL PATHOLOGIC DIAGNOSIS: NORMAL
GROSS: NORMAL
Lab: NORMAL
MICROSCOPIC EXAM: NORMAL

## 2022-12-12 ENCOUNTER — OFFICE VISIT (OUTPATIENT)
Dept: UROLOGY | Facility: CLINIC | Age: 77
End: 2022-12-12
Payer: MEDICARE

## 2022-12-12 VITALS
WEIGHT: 192 LBS | SYSTOLIC BLOOD PRESSURE: 114 MMHG | DIASTOLIC BLOOD PRESSURE: 73 MMHG | HEART RATE: 85 BPM | BODY MASS INDEX: 31.99 KG/M2 | HEIGHT: 65 IN

## 2022-12-12 DIAGNOSIS — N28.1 RENAL CYST: Primary | ICD-10-CM

## 2022-12-12 DIAGNOSIS — C67.9 MALIGNANT NEOPLASM OF URINARY BLADDER, UNSPECIFIED SITE: ICD-10-CM

## 2022-12-12 PROCEDURE — 99215 OFFICE O/P EST HI 40 MIN: CPT | Mod: PBBFAC | Performed by: UROLOGY

## 2022-12-12 PROCEDURE — 52000 PR CYSTOURETHROSCOPY: ICD-10-PCS | Mod: S$PBB,,, | Performed by: UROLOGY

## 2022-12-12 PROCEDURE — 99499 UNLISTED E&M SERVICE: CPT | Mod: S$PBB,,, | Performed by: UROLOGY

## 2022-12-12 PROCEDURE — 99999 PR PBB SHADOW E&M-EST. PATIENT-LVL V: CPT | Mod: PBBFAC,,, | Performed by: UROLOGY

## 2022-12-12 PROCEDURE — 52000 CYSTOURETHROSCOPY: CPT | Mod: PBBFAC | Performed by: UROLOGY

## 2022-12-12 PROCEDURE — 99499 NO LOS: ICD-10-PCS | Mod: S$PBB,,, | Performed by: UROLOGY

## 2022-12-12 PROCEDURE — 99999 PR PBB SHADOW E&M-EST. PATIENT-LVL V: ICD-10-PCS | Mod: PBBFAC,,, | Performed by: UROLOGY

## 2022-12-12 PROCEDURE — 52000 CYSTOURETHROSCOPY: CPT | Mod: S$PBB,,, | Performed by: UROLOGY

## 2022-12-12 RX ORDER — CIPROFLOXACIN 500 MG/1
500 TABLET ORAL
Status: ACTIVE | OUTPATIENT
Start: 2022-12-12

## 2022-12-12 RX ORDER — LIDOCAINE HYDROCHLORIDE 20 MG/ML
JELLY TOPICAL
Status: ACTIVE | OUTPATIENT
Start: 2022-12-12

## 2022-12-12 NOTE — PROGRESS NOTES
Chief Complaint   Patient presents with    Annual Exam         History of Present Illness:     12/12/22- here for cysto. No gross hematuria. She does report some pain in her flanks bilaterally intermittently.   12/16/2021- Here for cysto  12/16/2020- normal cysto.  CT urogram with L renal cyst and hydronephrosis. Mag-3 renal scan with no obstruction.  12/1/2020-Pt here for follow up. She has a h/o urolithiasis and bladder cancer, with last recurrence in 7/2012. She had a bladder biopsy by me last year, which was negative for malignancy.    Within the last 2-3 weeks, she has had bilateral flank pain. She is also having urinary frequency. She also had an episode of urge incontinence during the nighttime. +urgency. Lasted for about 3 days. Also had some dysuria at that time. No gross hematuria.   She also felt like she was having abdominal swelling. She is also due for her yearly bladder cancer surveillance cysto.       Review of Systems:   Constitutional: Pt denies fever, chills, change in appetite or unintentional weight loss  HENT: No drooling, loss of hearing, mouth sores, facial swelling  Eyes: No photophobia, visual disturbance or eye pain  Respiratory: No cough, dyspnea, wheezing  Cardiovascular: No chest pain, palpitations or leg swelling  GI: No constipation, diarrhea, nausea, vomiting, melena or hematochezia  : No genital lesions, discharge, nocturnal enuresis  Endocrine: No polydipsia, polyphagia, heat or cold intolerance  Musculoskeletal: +lower back pain, shoulder and knee pain  Integument: No color change, rash or wounds  Neurological: No seizures, speech difficulty or tremors  Psychiatric: No confusion, self-harm or Hallucinations    Current Outpatient Medications   Medication Sig Dispense Refill    acetaminophen-codeine 300-30mg (TYLENOL #3) 300-30 mg Tab TAKE 1 TABLET BY MOUTH EVERY 4 HOURS AS NEEDED FOR PAIN FOR UP TO 10 DAYS      amLODIPine (NORVASC) 10 MG tablet Take 10 mg by mouth once daily.       aspirin (ECOTRIN) 81 MG EC tablet Take 81 mg by mouth once daily.      baclofen (LIORESAL) 10 MG tablet Take 10-20 mg by mouth nightly as needed.      carvediloL (COREG) 6.25 MG tablet Take 1 tablet by mouth 2 (two) times daily with meals      clopidogreL (PLAVIX) 75 mg tablet Take 75 mg by mouth.      cyclobenzaprine (FLEXERIL) 5 MG tablet Take 5 mg by mouth 3 (three) times daily as needed.      DULoxetine (CYMBALTA) 60 MG capsule Take 60 mg by mouth once daily.      fluocinonide 0.05% (LIDEX) 0.05 % cream Apply topically.      gabapentin (NEURONTIN) 100 MG capsule       HYDROcodone-acetaminophen (NORCO) 7.5-325 mg per tablet Take 1 tablet by mouth every 6 (six) hours as needed.      lansoprazole (PREVACID) 30 MG capsule Take 30 mg by mouth.      levocetirizine (XYZAL) 5 MG tablet Take 1 tablet each morning. 30 tablet 11    linaCLOtide (LINZESS) 145 mcg Cap capsule TAKE 1 CAPSULE BY MOUTH ONCE DAILY IN THE MORNING BEFORE BREAKFAST      LIVALO 4 mg Tab Take 1 tablet by mouth once daily.      metFORMIN (GLUCOPHAGE) 500 MG tablet       montelukast (SINGULAIR) 10 mg tablet TAKE 1 TABLET BY MOUTH ONCE DAILY IN THE EVENING 30 tablet 0    multivitamin capsule Take 1 capsule by mouth.      ondansetron (ZOFRAN-ODT) 4 MG TbDL Take 4 mg by mouth every 8 (eight) hours as needed.      oxyCODONE-acetaminophen (PERCOCET)  mg per tablet Take 1 tablet by mouth every 4 (four) hours as needed.      pantoprazole (PROTONIX) 40 MG tablet Take 40 mg by mouth.      pimecrolimus (ELIDEL) 1 % cream Apply topically 2 (two) times daily. 100 g 1    potassium chloride SA (K-DUR,KLOR-CON) 20 MEQ tablet Take 1 tablet by mouth 2 (two) daily      pramoxine-hydrocortisone cream AAA TID 57 g 3    RESTASIS 0.05 % ophthalmic emulsion       sucralfate (CARAFATE) 1 gram tablet Take 1 g by mouth 4 (four) times daily.      triamterene-hydrochlorothiazide 37.5-25 mg (MAXZIDE-25) 37.5-25 mg per tablet Take 1 tablet by mouth once daily.       valsartan (DIOVAN) 160 MG tablet Take 160 mg by mouth once daily.       Current Facility-Administered Medications   Medication Dose Route Frequency Provider Last Rate Last Admin    ciprofloxacin HCl tablet 500 mg  500 mg Oral 1 time in Clinic/HOD Suma Terrazas MD        LIDOcaine HCl 2% urojet   Mucous Membrane 1 time in Clinic/HOD Suma Terrazas MD        LIDOcaine HCl 2% urojet   Mucous Membrane 1 time in Clinic/HOD Suma Terrazas MD           Review of patient's allergies indicates:   Allergen Reactions    Hydrocodone     Prednisone Other (See Comments)     Causes anxiety    Atorvastatin      Muscle spasm    Latex      Added based on information entered during case entry, please review and add reactions, type, and severity as needed       Past medical, family, and social history reviewed as documented in chart with pertinent positive medical, family, and social history detailed in HPI.    Physical Exam  Vitals:    12/12/22 1239   BP: 114/73   Pulse: 85       General: Well-developed, Well Nourished in No acute distress  HEENT: Normocephalic, Atraumatic, Extraocular Movements intact  Neck: Supple, trachea midline, no cervical or supraclavicular lymphadenopathy  Respirations: Even and unlabored  Extremities: Moves all equally, atraumatic, no clubbing, cyanosis or edema  Skin: warm and dry  Psych: normal affect  Neuro: Alert and oriented x 3, Cranial nerves II-XII grossly intact      Procedure: Cystoscopy    Procedure in detail:   Informed consent was obtained. The patient's genitalia was prepped and draped in the usual sterile fashion. Lidocaine jelly was administered per urethra. I utilized the flexible cystoscope and advanced it into the urethral meatus. Bladder access was obtained. Systematic review of the bladder was performed, noting no stones, foreign bodies or masses. Bilateral ureteral orifices were visualized and noted to be effluxing clear urine. Retroflexion was utilized to visualize the  bladder neck, noting no lesions. The scope was slowly backed out of the urethra, noting no urethral lesions. The patient tolerated the procedure well. Estimated blood loss was 0cc.       Assesment:   1. Renal cyst  US Retroperitoneal Complete      2. Malignant neoplasm of urinary bladder, unspecified site                Plan:  Renal cyst  -     US Retroperitoneal Complete; Future; Expected date: 12/12/2022    Malignant neoplasm of urinary bladder, unspecified site    Other orders  -     ciprofloxacin HCl tablet 500 mg  -     LIDOcaine HCl 2% urojet          Follow up in about 1 year (around 12/12/2023) for Cystoscopy.

## 2022-12-19 ENCOUNTER — HOSPITAL ENCOUNTER (OUTPATIENT)
Dept: RADIOLOGY | Facility: HOSPITAL | Age: 77
Discharge: HOME | End: 2022-12-19
Attending: UROLOGY
Payer: MEDICARE

## 2022-12-19 DIAGNOSIS — N28.1 RENAL CYST: ICD-10-CM

## 2022-12-19 PROCEDURE — 76770 US RETROPERITONEAL COMPLETE: ICD-10-PCS | Mod: 26,,, | Performed by: RADIOLOGY

## 2022-12-19 PROCEDURE — 76770 US EXAM ABDO BACK WALL COMP: CPT | Mod: 26,,, | Performed by: RADIOLOGY

## 2022-12-19 PROCEDURE — 76770 US EXAM ABDO BACK WALL COMP: CPT | Mod: TC,PO

## 2023-01-12 ENCOUNTER — TELEPHONE (OUTPATIENT)
Dept: DERMATOLOGY | Facility: CLINIC | Age: 78
End: 2023-01-12
Payer: MEDICARE

## 2023-01-12 NOTE — TELEPHONE ENCOUNTER
Attempted to call patient no answer left voicemail for a callback     ----- Message from Lashell Garcia sent at 1/12/2023 12:22 PM CST -----  Contact: Porsha  Porsha would like a call back at [phone] or [phone] in regards to getting results from a biopsy she had in office in October with the office. Patient states she still hasn't received the results and she its still itching, and would like to see if something can be sent to pharmacy   [facility] [address] [place] [id number] [place] [address] [facility] [address]  Phone: [phone] Fax: [phone]      Thanks   Am

## 2023-01-13 ENCOUNTER — TELEPHONE (OUTPATIENT)
Dept: DERMATOLOGY | Facility: CLINIC | Age: 78
End: 2023-01-13
Payer: MEDICARE

## 2023-01-13 NOTE — TELEPHONE ENCOUNTER
----- Message from Valerie Doty MD sent at 1/13/2023 12:37 PM CST -----  Contact: Porsha  Please let pt know the rash was suspicious for autoimmune condition (in the lupus family, similar to her arthritis).  If the rash has persisted, please schedule f/u to discuss treatment options further.   ----- Message -----  From: Citlalli Ruiz MA  Sent: 1/13/2023  10:45 AM CST  To: Valerie Doty MD    Patient is asking for results from biopsy. Patient had an appointment on 11/28 to received results but did not show   ----- Message -----  From: Lisa Patino MA  Sent: 1/12/2023   2:36 PM CST  To: Citlalli Ruiz MA      ----- Message -----  From: Rosenda Crook  Sent: 1/12/2023   2:10 PM CST  To: Soren Padilla Staff    Type:  Patient Returning Call    Who Called:Porsha  Who Left Message for Patient:Citlalli Ruiz MA   Does the patient know what this is regarding?:Biopsy results that were never received  Would the patient rather a call back or a response via [facility]? Call back  Best Call Back Number:[phone]  Additional Information:

## 2023-01-17 ENCOUNTER — TELEPHONE (OUTPATIENT)
Dept: DERMATOLOGY | Facility: CLINIC | Age: 78
End: 2023-01-17
Payer: MEDICARE

## 2023-01-17 NOTE — TELEPHONE ENCOUNTER
Spoke with patient, was able to get patient schedule for Monday January 23rd.    ----- Message from Mohsen Case sent at 1/17/2023  1:22 PM CST -----  Contact: self  Pt is calling in regards to receiving results from biopsy preformed in October .She is also asking for meds to be called for the rash as soon as possible      Pls call her back at [phone]   Thank you    M.r.         [facility] [address] [address]  Phone: [phone] Fax: [phone]

## 2023-01-18 ENCOUNTER — OFFICE VISIT (OUTPATIENT)
Dept: DERMATOLOGY | Facility: CLINIC | Age: 78
End: 2023-01-18
Payer: MEDICARE

## 2023-01-18 DIAGNOSIS — L30.8 INTERFACE DERMATITIS: Primary | ICD-10-CM

## 2023-01-18 DIAGNOSIS — L29.9 GENERALIZED PRURITUS: ICD-10-CM

## 2023-01-18 DIAGNOSIS — L29.9 PRURITUS: ICD-10-CM

## 2023-01-18 PROCEDURE — 99999 PR PBB SHADOW E&M-EST. PATIENT-LVL II: CPT | Mod: PBBFAC,,, | Performed by: PHYSICIAN ASSISTANT

## 2023-01-18 PROCEDURE — 99214 OFFICE O/P EST MOD 30 MIN: CPT | Mod: S$GLB,,, | Performed by: PHYSICIAN ASSISTANT

## 2023-01-18 PROCEDURE — 99214 PR OFFICE/OUTPT VISIT, EST, LEVL IV, 30-39 MIN: ICD-10-PCS | Mod: S$GLB,,, | Performed by: PHYSICIAN ASSISTANT

## 2023-01-18 PROCEDURE — 99999 PR PBB SHADOW E&M-EST. PATIENT-LVL II: ICD-10-PCS | Mod: PBBFAC,,, | Performed by: PHYSICIAN ASSISTANT

## 2023-01-18 PROCEDURE — 99212 OFFICE O/P EST SF 10 MIN: CPT | Mod: PBBFAC,PO | Performed by: PHYSICIAN ASSISTANT

## 2023-01-18 RX ORDER — DOXEPIN HYDROCHLORIDE 50 MG/G
CREAM TOPICAL
Qty: 45 G | Refills: 0 | Status: SHIPPED | OUTPATIENT
Start: 2023-01-18 | End: 2023-01-18 | Stop reason: CLARIF

## 2023-01-18 RX ORDER — HYDROCORTISONE ACETATE, PRAMOXINE HCL 2.5; 1 G/100G; G/100G
CREAM TOPICAL
Qty: 57 G | Refills: 3 | Status: SHIPPED | OUTPATIENT
Start: 2023-01-18

## 2023-01-18 NOTE — PROGRESS NOTES
"  Subjective:       Patient ID:  Porsha Adams is a 77 y.o. 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, last seen 1/5/23), + thyroid autoantibodies (anti-TPO), last seen on 10/17/22 by Dr. Doty and s/p biopsy at that time. Has not had f/u since biopsy. Previous tx: xyzal, hydroxyzine and pramoxine-HTC (requesting refill), elidel cream (given by allergist), IM steroid injection (TAC 40 mg).     She states pruritus is severe, 10/10 in severity at times but today she notes 5/10.  She states general itching has been persistent, and her skin looks "blotchy."     Currently on cymbalta for nerve pain/neuropathy.     Pruritus w/u and CXR negative, except for mild decrease in TSH.     Path results from 10/17/22:  Component 3 mo ago   Final Pathologic Diagnosis 1. 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   2. Skin, left forearm B, punch biopsy:   - SUBTLE VACUOLAR INTERFACE DERMATITIS WITH PIGMENT INCONTINENCE (SEE   COMMENT).   COMMENT:  The histologic features present in the specimen are most suggestive   of a largely quiescent or partially treated connective tissue disease   process.  The presence of extremely focal mild spongiosis and apoptotic   keratinocytes that localize above the basal cell layer are features that may   favor subacute cutaneous lupus, though an alternative connective tissue   disease process can not be excluded.   Comment: Interp By Roberto Carlos Mosqueda M.D., Signed on 10/24/2022 at 13:19    Reveiwed external rheum notes, old path notes, and labs today.      Review of Systems     Objective:    Physical Exam   Constitutional: She appears well-developed and well-nourished. No distress.   Neurological: She is alert and oriented to person, place, " and time. She is not disoriented.   Psychiatric: She has a normal mood and affect.   Skin:   Areas Examined (abnormalities noted in diagram):   Head / Face Inspection Performed  Neck Inspection Performed  Chest / Axilla Inspection Performed  Back Inspection Performed  RUE Inspected  LUE Inspection Performed                 Diagram Legend     Erythematous scaling macule/papule c/w actinic keratosis       Vascular papule c/w angioma      Pigmented verrucoid papule/plaque c/w seborrheic keratosis      Yellow umbilicated papule c/w sebaceous hyperplasia      Irregularly shaped tan macule c/w lentigo     1-2 mm smooth white papules consistent with Milia      Movable subcutaneous cyst with punctum c/w epidermal inclusion cyst      Subcutaneous movable cyst c/w pilar cyst      Firm pink to brown papule c/w dermatofibroma      Pedunculated fleshy papule(s) c/w skin tag(s)      Evenly pigmented macule c/w junctional nevus     Mildly variegated pigmented, slightly irregular-bordered macule c/w mildly atypical nevus      Flesh colored to evenly pigmented papule c/w intradermal nevus       Pink pearly papule/plaque c/w basal cell carcinoma      Erythematous hyperkeratotic cursted plaque c/w SCC      Surgical scar with no sign of skin cancer recurrence      Open and closed comedones      Inflammatory papules and pustules      Verrucoid papule consistent consistent with wart     Erythematous eczematous patches and plaques     Dystrophic onycholytic nail with subungual debris c/w onychomycosis     Umbilicated papule    Erythematous-base heme-crusted tan verrucoid plaque consistent with inflamed seborrheic keratosis     Erythematous Silvery Scaling Plaque c/w Psoriasis     See annotation      Assessment / Plan:        Interface dermatitis  Generalized pruritus  Pruritus  -     pramoxine-hydrocortisone cream; AAA TID  Dispense: 57 g; Refill: 3  -     Discontinue: doxepin (ZONALON) 5 % cream; AAA bid prn itching. Use sparingly only  on affected areas.  Dispense: 45 g; Refill: 0  Agree to above rx refill. Reviewed path results with pt and discussed potential association w/CTD. Encouraged daily spf 30 for sun exposed skin, discussed potential for sun sensitivity. May reconsider topical doxepine in future. Schedule for derm MD f/u in the next 4-6 weeks.            Follow up in about 4 weeks (around 2/15/2023) for to see Dermatology MD.

## 2023-01-18 NOTE — PATIENT INSTRUCTIONS
Sunscreens for the Face  Super Goop Mineral Sunscreen*    Cetaphil Sheer Mineral    Elta MD UV Clear    Start broad spectrum sunscreen with SPF 30+ and zinc oxide and/or titanium dioxide.  Use sunscreen daily.

## 2023-01-19 ENCOUNTER — TELEPHONE (OUTPATIENT)
Dept: UROLOGY | Facility: CLINIC | Age: 78
End: 2023-01-19
Payer: MEDICARE

## 2023-01-19 NOTE — TELEPHONE ENCOUNTER
Spoke to pt spouse and inform him about pt results.    ----- Message from Suma Terrazas MD sent at 1/12/2023  3:25 PM CST -----  Please notify patient her kidney cyst appears stable. We will continue to monitor.

## 2023-12-12 ENCOUNTER — PROCEDURE VISIT (OUTPATIENT)
Dept: UROLOGY | Facility: CLINIC | Age: 78
End: 2023-12-12
Payer: MEDICARE

## 2023-12-12 VITALS
WEIGHT: 190.94 LBS | DIASTOLIC BLOOD PRESSURE: 82 MMHG | SYSTOLIC BLOOD PRESSURE: 149 MMHG | HEART RATE: 68 BPM | BODY MASS INDEX: 31.77 KG/M2

## 2023-12-12 DIAGNOSIS — C67.9 MALIGNANT NEOPLASM OF URINARY BLADDER, UNSPECIFIED SITE: Primary | ICD-10-CM

## 2023-12-12 PROCEDURE — 52000 PR CYSTOURETHROSCOPY: ICD-10-PCS | Mod: S$GLB,,, | Performed by: UROLOGY

## 2023-12-12 PROCEDURE — 52000 CYSTOURETHROSCOPY: CPT | Mod: S$GLB,,, | Performed by: UROLOGY

## 2023-12-12 RX ORDER — CIPROFLOXACIN 500 MG/1
500 TABLET ORAL
Status: COMPLETED | OUTPATIENT
Start: 2023-12-12 | End: 2023-12-12

## 2023-12-12 RX ORDER — LIDOCAINE HYDROCHLORIDE 20 MG/ML
JELLY TOPICAL
Status: COMPLETED | OUTPATIENT
Start: 2023-12-12 | End: 2023-12-12

## 2023-12-12 RX ADMIN — CIPROFLOXACIN 500 MG: 500 TABLET ORAL at 03:12

## 2023-12-12 RX ADMIN — LIDOCAINE HYDROCHLORIDE 10 ML: 20 JELLY TOPICAL at 03:12

## 2023-12-12 NOTE — PROCEDURES
Chief Complaint   Patient presents with    Other     Cysto          History of Present Illness:     12/12/23-here for yearly surveillance cysto. No gross hematuria.   12/12/22- here for cysto. No gross hematuria. She does report some pain in her flanks bilaterally intermittently.   12/16/2021- Here for cysto  12/16/2020- normal cysto.  CT urogram with L renal cyst and hydronephrosis. Mag-3 renal scan with no obstruction.  12/1/2020-Pt here for follow up. She has a h/o urolithiasis and bladder cancer, with last recurrence in 7/2012. She had a bladder biopsy by me last year, which was negative for malignancy.    Within the last 2-3 weeks, she has had bilateral flank pain. She is also having urinary frequency. She also had an episode of urge incontinence during the nighttime. +urgency. Lasted for about 3 days. Also had some dysuria at that time. No gross hematuria.   She also felt like she was having abdominal swelling. She is also due for her yearly bladder cancer surveillance cysto.       Review of Systems:   Constitutional: Pt denies fever, chills, change in appetite or unintentional weight loss  HENT: No drooling, loss of hearing, mouth sores, facial swelling  Eyes: No photophobia, visual disturbance or eye pain  Respiratory: No cough, dyspnea, wheezing  Cardiovascular: No chest pain, palpitations or leg swelling  GI: No constipation, diarrhea, nausea, vomiting, melena or hematochezia  : No genital lesions, discharge, nocturnal enuresis  Endocrine: No polydipsia, polyphagia, heat or cold intolerance  Musculoskeletal: +lower back pain, shoulder and knee pain  Integument: No color change, rash or wounds  Neurological: No seizures, speech difficulty or tremors  Psychiatric: No confusion, self-harm or Hallucinations    Current Outpatient Medications   Medication Sig Dispense Refill    acetaminophen-codeine 300-30mg (TYLENOL #3) 300-30 mg Tab TAKE 1 TABLET BY MOUTH EVERY 4 HOURS AS NEEDED FOR PAIN FOR UP TO 10 DAYS       amLODIPine (NORVASC) 10 MG tablet Take 10 mg by mouth once daily.      aspirin (ECOTRIN) 81 MG EC tablet Take 81 mg by mouth once daily.      baclofen (LIORESAL) 10 MG tablet Take 10-20 mg by mouth nightly as needed.      carvediloL (COREG) 6.25 MG tablet Take 1 tablet by mouth 2 (two) times daily with meals      clopidogreL (PLAVIX) 75 mg tablet Take 75 mg by mouth.      cyclobenzaprine (FLEXERIL) 5 MG tablet Take 5 mg by mouth 3 (three) times daily as needed.      DULoxetine (CYMBALTA) 60 MG capsule Take 60 mg by mouth once daily.      fluocinonide 0.05% (LIDEX) 0.05 % cream Apply topically.      gabapentin (NEURONTIN) 100 MG capsule       HYDROcodone-acetaminophen (NORCO) 7.5-325 mg per tablet Take 1 tablet by mouth every 6 (six) hours as needed.      lansoprazole (PREVACID) 30 MG capsule Take 30 mg by mouth.      levocetirizine (XYZAL) 5 MG tablet Take 1 tablet each morning. 30 tablet 11    linaCLOtide (LINZESS) 145 mcg Cap capsule TAKE 1 CAPSULE BY MOUTH ONCE DAILY IN THE MORNING BEFORE BREAKFAST      LIVALO 4 mg Tab Take 1 tablet by mouth once daily.      metFORMIN (GLUCOPHAGE) 500 MG tablet       montelukast (SINGULAIR) 10 mg tablet TAKE 1 TABLET BY MOUTH ONCE DAILY IN THE EVENING 30 tablet 0    multivitamin capsule Take 1 capsule by mouth.      ondansetron (ZOFRAN-ODT) 4 MG TbDL Take 4 mg by mouth every 8 (eight) hours as needed.      oxyCODONE-acetaminophen (PERCOCET)  mg per tablet Take 1 tablet by mouth every 4 (four) hours as needed.      pantoprazole (PROTONIX) 40 MG tablet Take 40 mg by mouth.      pimecrolimus (ELIDEL) 1 % cream Apply topically 2 (two) times daily. 100 g 1    potassium chloride SA (K-DUR,KLOR-CON) 20 MEQ tablet Take 1 tablet by mouth 2 (two) daily      pramoxine-hydrocortisone cream AAA TID 57 g 3    RESTASIS 0.05 % ophthalmic emulsion       sucralfate (CARAFATE) 1 gram tablet Take 1 g by mouth 4 (four) times daily.      triamterene-hydrochlorothiazide 37.5-25 mg  (MAXZIDE-25) 37.5-25 mg per tablet Take 1 tablet by mouth once daily.      valsartan (DIOVAN) 160 MG tablet Take 160 mg by mouth once daily.       Current Facility-Administered Medications   Medication Dose Route Frequency Provider Last Rate Last Admin    ciprofloxacin HCl tablet 500 mg  500 mg Oral 1 time in Clinic/HOD Suma Terrazas MD        ciprofloxacin HCl tablet 500 mg  500 mg Oral 1 time in Clinic/HOD Suma Terrazas MD        LIDOcaine HCl 2% urojet   Mucous Membrane 1 time in Clinic/HOD Suma Terrazas MD        LIDOcaine HCl 2% urojet   Mucous Membrane 1 time in Clinic/HOD Suma Terrazas MD        LIDOcaine HCl 2% urojet   Mucous Membrane 1 time in Clinic/HOD Suma Terrazas MD           Review of patient's allergies indicates:   Allergen Reactions    Hydrocodone     Prednisone Other (See Comments)     Causes anxiety    Atorvastatin      Muscle spasm    Latex      Added based on information entered during case entry, please review and add reactions, type, and severity as needed       Past medical, family, and social history reviewed as documented in chart with pertinent positive medical, family, and social history detailed in HPI.    Physical Exam  Vitals:    12/12/23 1441   BP: (!) 149/82   Pulse: 68       General: Well-developed, Well Nourished in No acute distress  HEENT: Normocephalic, Atraumatic, Extraocular Movements intact  Neck: Supple, trachea midline, no cervical or supraclavicular lymphadenopathy  Respirations: Even and unlabored  Extremities: Moves all equally, atraumatic, no clubbing, cyanosis or edema  Skin: warm and dry  Psych: normal affect  Neuro: Alert and oriented x 3, Cranial nerves II-XII grossly intact    UA: negative for blood, LE, nit    Procedure: Cystoscopy    Procedure in detail:   Informed consent was obtained. The patient's genitalia was prepped and draped in the usual sterile fashion. Lidocaine jelly was administered per urethra. I utilized the flexible  cystoscope and advanced it into the urethral meatus. Bladder access was obtained. Systematic review of the bladder was performed, noting no stones, foreign bodies or masses. Bilateral ureteral orifices were visualized and noted to be effluxing clear urine. Retroflexion was utilized to visualize the bladder neck, noting no lesions. The scope was slowly backed out of the urethra, noting no urethral lesions. The patient tolerated the procedure well. Estimated blood loss was 0cc.       Assesment:   1. Malignant neoplasm of urinary bladder, unspecified site                  Plan:  Malignant neoplasm of urinary bladder, unspecified site    Other orders  -     ciprofloxacin HCl tablet 500 mg  -     LIDOcaine HCl 2% urojet            Follow up in about 1 year (around 12/12/2024) for Cystoscopy.

## 2023-12-12 NOTE — PROGRESS NOTES
..Per Dr. Terrazas oral ciprofloxacin 500 MG was given to pt. Pt instructed to remain in clinic for 15 minutes to monitor for signs & symptoms of adverse reaction. Pt verbalized understanding.      Loraine Milanes RN

## 2023-12-13 ENCOUNTER — TELEPHONE (OUTPATIENT)
Dept: UROLOGY | Facility: CLINIC | Age: 78
End: 2023-12-13
Payer: MEDICARE

## 2023-12-13 DIAGNOSIS — C67.9 MALIGNANT NEOPLASM OF URINARY BLADDER, UNSPECIFIED SITE: Primary | ICD-10-CM

## 2023-12-13 LAB
BILIRUB UR QL STRIP: NEGATIVE
GLUCOSE UR QL STRIP: NEGATIVE
KETONES UR QL STRIP: NEGATIVE
LEUKOCYTE ESTERASE UR QL STRIP: NEGATIVE
PH, POC UA: 7
POC BLOOD, URINE: NEGATIVE
POC NITRATES, URINE: NEGATIVE
PROT UR QL STRIP: NEGATIVE
SP GR UR STRIP: 1.02 (ref 1–1.03)
UROBILINOGEN UR STRIP-ACNC: 0.2 (ref 0.1–1.1)

## 2023-12-13 PROCEDURE — 81003 POCT URINALYSIS, DIPSTICK, AUTOMATED, W/O SCOPE: ICD-10-PCS | Mod: QW,S$GLB,, | Performed by: UROLOGY

## 2023-12-13 PROCEDURE — 81003 URINALYSIS AUTO W/O SCOPE: CPT | Mod: QW,S$GLB,, | Performed by: UROLOGY

## 2025-02-07 ENCOUNTER — TELEPHONE (OUTPATIENT)
Dept: UROLOGY | Facility: CLINIC | Age: 80
End: 2025-02-07
Payer: MEDICARE

## 2025-02-07 NOTE — TELEPHONE ENCOUNTER
.Outgoing call placed to patient, patient verified name and date of birth, patient notified of availability, appointment scheduled per patient request, no further assistance needed.       ----- Message from Susan sent at 2/7/2025 10:11 AM CST -----  Contact: Porsha  Type:  Sooner Apoointment Request    Caller is requesting a sooner appointment. Caller will not accept being placed on the waitlist and is requesting a message be sent to doctor.  Name of Caller:Porsha  When is the first available appointment?scheduled 5/14/25  Symptoms:Annual/cancer care  Would the patient rather a call back or a response via [facility]? call  Best Call Back Number:[phone]   Additional Information: Please give patient a call back to assist.  Thank you,  GH

## 2025-02-27 ENCOUNTER — TELEPHONE (OUTPATIENT)
Dept: UROLOGY | Facility: CLINIC | Age: 80
End: 2025-02-27
Payer: MEDICARE

## 2025-02-27 NOTE — TELEPHONE ENCOUNTER
.Outgoing call placed to patient, patient verified name and date of birth, patient wanted soonest appt, appt scheduled as requested.      ----- Message from River sent at 2/27/2025  4:55 PM CST -----  Contact: self  .Type:  Sooner Apoointment RequestCaller is requesting a sooner appointment.  Caller declined first available appointment listed below.  Caller will not accept being placed on the waitlist and is requesting a message be sent to doctor.Name of Caller:Porsha AdamsWould the patient rather a call back or a response via [facility]? Call [facility] Call Back Number:[phone] Information: pt would like a call back to reschedule appt she missed on 02/25

## 2025-03-26 ENCOUNTER — PROCEDURE VISIT (OUTPATIENT)
Dept: UROLOGY | Facility: CLINIC | Age: 80
End: 2025-03-26
Payer: MEDICARE

## 2025-03-26 ENCOUNTER — HOSPITAL ENCOUNTER (OUTPATIENT)
Dept: RADIOLOGY | Facility: HOSPITAL | Age: 80
Discharge: HOME | End: 2025-03-26
Attending: UROLOGY
Payer: MEDICARE

## 2025-03-26 VITALS
HEART RATE: 70 BPM | BODY MASS INDEX: 31.77 KG/M2 | HEIGHT: 65 IN | SYSTOLIC BLOOD PRESSURE: 134 MMHG | DIASTOLIC BLOOD PRESSURE: 74 MMHG | RESPIRATION RATE: 18 BRPM

## 2025-03-26 DIAGNOSIS — C67.9 MALIGNANT NEOPLASM OF URINARY BLADDER, UNSPECIFIED SITE: Primary | ICD-10-CM

## 2025-03-26 DIAGNOSIS — N20.0 RENAL STONE: ICD-10-CM

## 2025-03-26 DIAGNOSIS — N30.00 ACUTE CYSTITIS WITHOUT HEMATURIA: ICD-10-CM

## 2025-03-26 LAB
BILIRUB UR QL STRIP: POSITIVE
GLUCOSE UR QL STRIP: NEGATIVE
KETONES UR QL STRIP: NEGATIVE
LEUKOCYTE ESTERASE UR QL STRIP: POSITIVE
PH, POC UA: 6
POC BLOOD, URINE: POSITIVE
POC NITRATES, URINE: NEGATIVE
PROT UR QL STRIP: POSITIVE
SP GR UR STRIP: 1.02 (ref 1–1.03)
UROBILINOGEN UR STRIP-ACNC: 1 (ref 0.1–1.1)

## 2025-03-26 PROCEDURE — 76770 US EXAM ABDO BACK WALL COMP: CPT | Mod: 26,,, | Performed by: RADIOLOGY

## 2025-03-26 PROCEDURE — 99499 UNLISTED E&M SERVICE: CPT | Mod: S$GLB,,, | Performed by: UROLOGY

## 2025-03-26 PROCEDURE — 76770 US EXAM ABDO BACK WALL COMP: CPT | Mod: TC,PN

## 2025-03-26 PROCEDURE — 52000 CYSTOURETHROSCOPY: CPT | Mod: S$GLB,,, | Performed by: UROLOGY

## 2025-03-26 PROCEDURE — 87186 SC STD MICRODIL/AGAR DIL: CPT | Performed by: UROLOGY

## 2025-03-26 PROCEDURE — 81003 URINALYSIS AUTO W/O SCOPE: CPT | Mod: QW,S$GLB,, | Performed by: UROLOGY

## 2025-03-26 RX ORDER — CIPROFLOXACIN 500 MG/1
500 TABLET ORAL
Status: COMPLETED | OUTPATIENT
Start: 2025-03-26 | End: 2025-03-26

## 2025-03-26 RX ORDER — LIDOCAINE HYDROCHLORIDE 20 MG/ML
JELLY TOPICAL
Status: COMPLETED | OUTPATIENT
Start: 2025-03-26 | End: 2025-03-26

## 2025-03-26 RX ORDER — NITROFURANTOIN 25; 75 MG/1; MG/1
100 CAPSULE ORAL 2 TIMES DAILY
Qty: 14 CAPSULE | Refills: 0 | Status: SHIPPED | OUTPATIENT
Start: 2025-03-26

## 2025-03-26 RX ADMIN — LIDOCAINE HYDROCHLORIDE 11 ML: 20 JELLY TOPICAL at 12:03

## 2025-03-26 RX ADMIN — CIPROFLOXACIN 500 MG: 500 TABLET ORAL at 12:03

## 2025-03-26 NOTE — PROGRESS NOTES
.Patient came in for a cystoscopy, Ciprofloxacin 500 mg tab to be administered orally to help prevent infection and urojet 2% jelly to provide comfort during the procedure. Confirmed patient's allergies, Ciprofloxacin 500 mg tab administered as ordered. Pt tolerated medication administration well. Patient agreed to wait 15 minutes after medication administration in the clinic and to report any adverse reactions.        Lucas Cruz RN

## 2025-03-26 NOTE — PROCEDURES
Chief Complaint   Patient presents with    Procedure         History of Present Illness:     3/26/25-here for surveillance cysto. Pt has had a CVA and now has hemiplegia. No gross hematuria.   12/12/23-here for yearly surveillance cysto. No gross hematuria.   12/12/22- here for cysto. No gross hematuria. She does report some pain in her flanks bilaterally intermittently.   12/16/2021- Here for cysto  12/16/2020- normal cysto.  CT urogram with L renal cyst and hydronephrosis. Mag-3 renal scan with no obstruction.  12/1/2020-Pt here for follow up. She has a h/o urolithiasis and bladder cancer, with last recurrence in 7/2012. She had a bladder biopsy by me last year, which was negative for malignancy.    Within the last 2-3 weeks, she has had bilateral flank pain. She is also having urinary frequency. She also had an episode of urge incontinence during the nighttime. +urgency. Lasted for about 3 days. Also had some dysuria at that time. No gross hematuria.   She also felt like she was having abdominal swelling. She is also due for her yearly bladder cancer surveillance cysto.       Review of Systems:   Constitutional: Pt denies fever, chills, change in appetite or unintentional weight loss  HENT: No drooling, loss of hearing, mouth sores, facial swelling  Eyes: No photophobia, visual disturbance or eye pain  Respiratory: No cough, dyspnea, wheezing  Cardiovascular: No chest pain, palpitations or leg swelling  GI: No constipation, diarrhea, nausea, vomiting, melena or hematochezia  : No genital lesions, discharge, nocturnal enuresis  Endocrine: No polydipsia, polyphagia, heat or cold intolerance  Musculoskeletal: +lower back pain, shoulder and knee pain  Integument: No color change, rash or wounds  Neurological: No seizures, speech difficulty or tremors  Psychiatric: No confusion, self-harm or Hallucinations    Current Outpatient Medications   Medication Sig Dispense Refill    acetaminophen-codeine 300-30mg (TYLENOL  #3) 300-30 mg Tab TAKE 1 TABLET BY MOUTH EVERY 4 HOURS AS NEEDED FOR PAIN FOR UP TO 10 DAYS      amLODIPine (NORVASC) 10 MG tablet Take 10 mg by mouth once daily.      aspirin (ECOTRIN) 81 MG EC tablet Take 81 mg by mouth once daily.      baclofen (LIORESAL) 10 MG tablet Take 10-20 mg by mouth nightly as needed.      carvediloL (COREG) 6.25 MG tablet Take 1 tablet by mouth 2 (two) times daily with meals      clopidogreL (PLAVIX) 75 mg tablet Take 75 mg by mouth.      cyclobenzaprine (FLEXERIL) 5 MG tablet Take 5 mg by mouth 3 (three) times daily as needed.      DULoxetine (CYMBALTA) 60 MG capsule Take 60 mg by mouth once daily.      fluocinonide 0.05% (LIDEX) 0.05 % cream Apply topically.      gabapentin (NEURONTIN) 100 MG capsule       HYDROcodone-acetaminophen (NORCO) 7.5-325 mg per tablet Take 1 tablet by mouth every 6 (six) hours as needed.      lansoprazole (PREVACID) 30 MG capsule Take 30 mg by mouth.      levocetirizine (XYZAL) 5 MG tablet Take 1 tablet each morning. 30 tablet 11    linaCLOtide (LINZESS) 145 mcg Cap capsule TAKE 1 CAPSULE BY MOUTH ONCE DAILY IN THE MORNING BEFORE BREAKFAST      LIVALO 4 mg Tab Take 1 tablet by mouth once daily.      metFORMIN (GLUCOPHAGE) 500 MG tablet       montelukast (SINGULAIR) 10 mg tablet TAKE 1 TABLET BY MOUTH ONCE DAILY IN THE EVENING 30 tablet 0    multivitamin capsule Take 1 capsule by mouth.      nitrofurantoin, macrocrystal-monohydrate, (MACROBID) 100 MG capsule Take 1 capsule (100 mg total) by mouth 2 (two) times daily. 14 capsule 0    ondansetron (ZOFRAN-ODT) 4 MG TbDL Take 4 mg by mouth every 8 (eight) hours as needed.      oxyCODONE-acetaminophen (PERCOCET)  mg per tablet Take 1 tablet by mouth every 4 (four) hours as needed.      pantoprazole (PROTONIX) 40 MG tablet Take 40 mg by mouth.      pimecrolimus (ELIDEL) 1 % cream Apply topically 2 (two) times daily. 100 g 1    potassium chloride SA (K-DUR,KLOR-CON) 20 MEQ tablet Take 1 tablet by mouth 2 (two)  daily      pramoxine-hydrocortisone cream AAA TID 57 g 3    RESTASIS 0.05 % ophthalmic emulsion       sucralfate (CARAFATE) 1 gram tablet Take 1 g by mouth 4 (four) times daily.      triamterene-hydrochlorothiazide 37.5-25 mg (MAXZIDE-25) 37.5-25 mg per tablet Take 1 tablet by mouth once daily.      valsartan (DIOVAN) 160 MG tablet Take 160 mg by mouth once daily.       Current Facility-Administered Medications   Medication Dose Route Frequency Provider Last Rate Last Admin    ciprofloxacin HCl tablet 500 mg  500 mg Oral 1 time in Clinic/HOD Suma Terrazas MD        ciprofloxacin HCl tablet 500 mg  500 mg Oral 1 time in Clinic/HOD         LIDOcaine HCl 2% urojet   Mucous Membrane 1 time in Clinic/HOD Suma Terrazas MD        LIDOcaine HCl 2% urojet   Mucous Membrane 1 time in Clinic/HOD Suma Terrazas MD        LIDOcaine HCl 2% urojet   Mucous Membrane 1 time in Clinic/HOD            Review of patient's allergies indicates:   Allergen Reactions    Hydrocodone     Prednisone Other (See Comments)     Causes anxiety    Atorvastatin      Muscle spasm    Latex      Added based on information entered during case entry, please review and add reactions, type, and severity as needed       Past medical, family, and social history reviewed as documented in chart with pertinent positive medical, family, and social history detailed in HPI.    Physical Exam  Vitals:    03/26/25 1052   BP: 134/74   Pulse: 70   Resp: 18         General: Well-developed, Well Nourished in No acute distress  HEENT: Normocephalic, Atraumatic, Extraocular Movements intact  Neck: Supple, trachea midline, no cervical or supraclavicular lymphadenopathy  Respirations: Even and unlabored  Extremities: Moves all equally, atraumatic, no clubbing, cyanosis or edema  Skin: warm and dry  Psych: normal affect  Neuro: Alert and oriented x 3, Cranial nerves II-XII grossly intact    UA: negative for blood, LE, nit    Procedure: Cystoscopy    Procedure  in detail:   Informed consent was obtained. The patient's genitalia was prepped and draped in the usual sterile fashion. Lidocaine jelly was administered per urethra. I utilized the flexible cystoscope and advanced it into the urethral meatus. Bladder access was obtained. Systematic review of the bladder was performed, noting no stones, foreign bodies or masses. 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 Bilateral ureteral orifices were visualized and noted to be effluxing clear urine. Retroflexion was utilized to visualize the bladder neck, noting no lesions. The scope was slowly backed out of the urethra, noting no urethral lesions. The patient tolerated the procedure well. Estimated blood loss was 0cc.       Assesment:   1. Malignant neoplasm of urinary bladder, unspecified site  POCT Urinalysis, Dipstick, Automated, W/O Scope      2. Renal stone  US Retroperitoneal Complete      3. Acute cystitis without hematuria  Urine Culture High Risk                Plan:  Malignant neoplasm of urinary bladder, unspecified site  -     POCT Urinalysis, Dipstick, Automated, W/O Scope    Renal stone  -     US Retroperitoneal Complete; Future; Expected date: 03/26/2025    Acute cystitis without hematuria  -     Urine Culture High Risk    Other orders  -     ciprofloxacin HCl tablet 500 mg  -     LIDOcaine HCl 2% urojet  -     nitrofurantoin, macrocrystal-monohydrate, (MACROBID) 100 MG capsule; Take 1 capsule (100 mg total) by mouth 2 (two) times daily.  Dispense: 14 capsule; Refill: 0        Follow up in about 1 year (around 3/26/2026) for Cystoscopy.

## 2025-03-29 LAB — BACTERIA UR CULT: ABNORMAL

## 2025-03-31 ENCOUNTER — RESULTS FOLLOW-UP (OUTPATIENT)
Dept: UROLOGY | Facility: CLINIC | Age: 80
End: 2025-03-31

## 2025-03-31 ENCOUNTER — TELEPHONE (OUTPATIENT)
Dept: UROLOGY | Facility: CLINIC | Age: 80
End: 2025-03-31
Payer: MEDICARE

## 2025-03-31 RX ORDER — CEFDINIR 300 MG/1
300 CAPSULE ORAL 2 TIMES DAILY
Qty: 14 CAPSULE | Refills: 0 | Status: SHIPPED | OUTPATIENT
Start: 2025-03-31 | End: 2025-04-07

## 2025-03-31 NOTE — TELEPHONE ENCOUNTER
.Outgoing call placed to patient, patient verified name and date of birth, patient notified of below, verbalized understanding and no further assistance needed.       ----- Message from Suma Terrazas MD sent at 3/31/2025  8:18 AM CDT -----  Please notify pt that the macrobid that I prescribed won't work. Cefdinir sent to her pharmacy instead for UTI.   ----- Message -----  From: Driss Reyna MA  Sent: 3/26/2025  11:28 AM CDT  To: Suma Terrazas MD